# Patient Record
Sex: FEMALE | Race: BLACK OR AFRICAN AMERICAN | Employment: UNEMPLOYED | URBAN - METROPOLITAN AREA
[De-identification: names, ages, dates, MRNs, and addresses within clinical notes are randomized per-mention and may not be internally consistent; named-entity substitution may affect disease eponyms.]

---

## 2017-12-18 ENCOUNTER — OP HISTORICAL/CONVERTED ENCOUNTER (OUTPATIENT)
Dept: OTHER | Age: 62
End: 2017-12-18

## 2018-01-12 ENCOUNTER — OP HISTORICAL/CONVERTED ENCOUNTER (OUTPATIENT)
Dept: OTHER | Age: 63
End: 2018-01-12

## 2018-01-19 ENCOUNTER — OP HISTORICAL/CONVERTED ENCOUNTER (OUTPATIENT)
Dept: OTHER | Age: 63
End: 2018-01-19

## 2018-02-06 ENCOUNTER — OP HISTORICAL/CONVERTED ENCOUNTER (OUTPATIENT)
Dept: OTHER | Age: 63
End: 2018-02-06

## 2018-03-12 ENCOUNTER — OP HISTORICAL/CONVERTED ENCOUNTER (OUTPATIENT)
Dept: OTHER | Age: 63
End: 2018-03-12

## 2018-04-26 ENCOUNTER — OP HISTORICAL/CONVERTED ENCOUNTER (OUTPATIENT)
Dept: OTHER | Age: 63
End: 2018-04-26

## 2018-05-31 ENCOUNTER — OP HISTORICAL/CONVERTED ENCOUNTER (OUTPATIENT)
Dept: OTHER | Age: 63
End: 2018-05-31

## 2019-06-14 ENCOUNTER — OP HISTORICAL/CONVERTED ENCOUNTER (OUTPATIENT)
Dept: OTHER | Age: 64
End: 2019-06-14

## 2021-06-01 ENCOUNTER — HOSPITAL ENCOUNTER (EMERGENCY)
Age: 66
Discharge: HOME | End: 2021-06-01
Attending: STUDENT IN AN ORGANIZED HEALTH CARE EDUCATION/TRAINING PROGRAM
Payer: MEDICARE

## 2021-06-01 VITALS
DIASTOLIC BLOOD PRESSURE: 107 MMHG | HEIGHT: 62 IN | OXYGEN SATURATION: 97 % | HEART RATE: 71 BPM | BODY MASS INDEX: 24.84 KG/M2 | WEIGHT: 135 LBS | RESPIRATION RATE: 18 BRPM | SYSTOLIC BLOOD PRESSURE: 159 MMHG | TEMPERATURE: 98 F

## 2021-06-01 DIAGNOSIS — T50.905A ADVERSE EFFECT OF DRUG, INITIAL ENCOUNTER: Primary | ICD-10-CM

## 2021-06-01 PROCEDURE — 99284 EMERGENCY DEPT VISIT MOD MDM: CPT

## 2021-06-01 NOTE — ED TRIAGE NOTES
Pt arrives by EMS from home. Pt gets soliris transfusions biweekly for MG. Pt thinks she is having a reaction to the infusion today as she thinks her R arm has a rash, she reports a headache & has bruising on her thumb fingernail. Pt is nonverbal but has a communication device.  Pt typed that Dr. Tez Escobar at [facility] is her [facility] [phone]

## 2021-06-01 NOTE — ED PROVIDER NOTES
EMERGENCY DEPARTMENT HISTORY AND PHYSICAL EXAM      Date: 6/1/2021  Patient Name: Donell Berry    History of Presenting Illness     Chief Complaint   Patient presents with    Allergic Reaction       History Provided By: Patient    HPI: Donell Berry, 60 y.o. female with a past medical history significant Myasthenia gravis presents to the ED with cc of rash to arms after receiving Soliris injection 2 weeks ago. Patient receives twice monthly injections for myasthenia gravis, states after injection 2 weeks ago noticed a rash on her bilateral arms and a little bit on her face. Patient states rash has not worsened over the last several weeks describes mild itching, no pain, no tongue or throat swelling no trouble breathing. Was concerned about reaction and came to emergency department today for further evaluation. Patient is supposed to follow-up at [facility] tomorrow for repeat injection. Patient denies any fevers chills, no chest pain shortness of breath no abdominal pain nausea vomiting. There are no other complaints, changes, or physical findings at this time. PCP: LUCIO Paz    No current facility-administered medications on file prior to encounter. No current outpatient medications on file prior to encounter. Past History     Past Medical History:  No past medical history on file. Past Surgical History:  No past surgical history on file. Family History:  No family history on file. Social History:  Social History     Tobacco Use    Smoking status: Not on file   Substance Use Topics    Alcohol use: Not on file    Drug use: Not on file       Allergies: Allergies   Allergen Reactions    Soliris [Eculizumab] Unknown (comments)         Review of Systems     Review of Systems   Constitutional: Negative for activity change, chills, fatigue and fever. HENT: Negative for congestion and trouble swallowing. Eyes: Negative for photophobia and visual disturbance. Respiratory: Negative for cough, chest tightness and shortness of breath. Cardiovascular: Negative for chest pain and palpitations. Gastrointestinal: Negative for abdominal distention, abdominal pain, diarrhea, nausea and vomiting. Genitourinary: Negative for dysuria, flank pain and frequency. Musculoskeletal: Negative for arthralgias, back pain and myalgias. Skin: Positive for rash. Negative for color change and pallor. Neurological: Negative for dizziness, tremors, weakness and headaches. Psychiatric/Behavioral: Negative for confusion. Physical Exam     Physical Exam  Vitals and nursing note reviewed. Constitutional:       General: She is not in acute distress. Appearance: Normal appearance. She is normal weight. She is not ill-appearing. HENT:      Head: Normocephalic and atraumatic. Nose: Nose normal.      Mouth/Throat:      Mouth: Mucous membranes are moist.   Eyes:      Extraocular Movements: Extraocular movements intact. Pupils: Pupils are equal, round, and reactive to light. Cardiovascular:      Rate and Rhythm: Normal rate and regular rhythm. Pulses: Normal pulses. Pulmonary:      Effort: Pulmonary effort is normal.   Abdominal:      General: Abdomen is flat. Bowel sounds are normal.      Palpations: Abdomen is soft. Tenderness: There is no abdominal tenderness. There is no guarding. Musculoskeletal:         General: No tenderness. Normal range of motion. Cervical back: Normal range of motion and neck supple. No muscular tenderness. Skin:     General: Skin is warm and dry. Findings: Rash present. Comments: Slight erythematous macules over forearms, no wheals, no excoriation   Neurological:      General: No focal deficit present. Mental Status: She is alert and oriented to person, place, and time. Sensory: No sensory deficit. Motor: No weakness.          Diagnostic Study Results     Labs -   No results found for this or any previous visit (from the past 12 hour(s)). Radiologic Studies -   @lastxrresult@  CT Results  (Last 48 hours)    None        CXR Results  (Last 48 hours)    None            Medical Decision Making   I am the first provider for this patient. I reviewed the vital signs, available nursing notes, past medical history, past surgical history, family history and social history. Vital Signs-Reviewed the patient's vital signs. Patient Vitals for the past 12 hrs:   Temp Pulse Resp BP SpO2   06/01/21 1719 -- 71 18 (!) 159/107 97 %   06/01/21 1559 98 °F (36.7 °C) 75 18 (!) 179/114 98 %       Records Reviewed: Nursing Notes and Old Medical Records    The patient presents with rash to arms after receiving infusion for Solaris 2 weeks ago with a differential diagnosis of medication reaction, allergic reaction, dermatitis      Provider Notes (Medical Decision Making):     MDM     61-year-old female, past medical history of mycelia gravis, currently receiving Solaris infusions every 2 weeks. Patient states 2 weeks ago after infusion she noticed a red rash erupt over her arms. Denies any worsening of rash denies any throat swelling no numbness tingling to mouth no trouble breathing. Was concerned about possible allergic reaction    Physical exam shows well-appearing female, acute [place] through language board due to inability to converse. Bilateral arms show fine occasional macules, no erythema no wheals clear oropharynx. No clear indication this is allergic reaction possibly could be a medication reaction, reassured patient that she can have infusion tomorrow, however if she does have any worsening symptoms, shortness of breath lip or tongue swelling to address physician at the clinic. ED Course:   Initial assessment performed. The patients presenting problems have been discussed, and they are in agreement with the care plan formulated and outlined with them.   I have encouraged them to ask questions as they arise throughout their visit. PROCEDURES  Procedures         PLAN:  1. There are no discharge medications for this patient. 2.   Follow-up Information     Follow up With Specialties Details Why Contact Info    LUCIO Robles Physician Assistant In 3 days  [address] [facility] [address] [phone]      Your neurologist tomorrow            Return to ED if worse     Diagnosis     Clinical Impression:   1.  Adverse effect of drug, initial encounter

## 2021-06-25 ENCOUNTER — TRANSCRIBE ORDER (OUTPATIENT)
Dept: SCHEDULING | Age: 66
End: 2021-06-25

## 2021-06-25 DIAGNOSIS — Z12.31 VISIT FOR SCREENING MAMMOGRAM: Primary | ICD-10-CM

## 2021-07-23 ENCOUNTER — HOSPITAL ENCOUNTER (OUTPATIENT)
Dept: MAMMOGRAPHY | Age: 66
Discharge: HOME | End: 2021-07-23
Payer: MEDICARE

## 2021-07-23 DIAGNOSIS — Z12.31 VISIT FOR SCREENING MAMMOGRAM: ICD-10-CM

## 2021-07-23 PROCEDURE — 77063 BREAST TOMOSYNTHESIS BI: CPT

## 2022-05-21 LAB
CREATININE, EXTERNAL: 0.58
LDL-C, EXTERNAL: 87
TOTAL CHOLESTEROL, NCHOLT: 171

## 2022-09-29 ENCOUNTER — APPOINTMENT (OUTPATIENT)
Dept: CT IMAGING | Age: 67
End: 2022-09-29
Attending: STUDENT IN AN ORGANIZED HEALTH CARE EDUCATION/TRAINING PROGRAM
Payer: MEDICARE

## 2022-09-29 ENCOUNTER — APPOINTMENT (OUTPATIENT)
Dept: GENERAL RADIOLOGY | Age: 67
End: 2022-09-29
Attending: STUDENT IN AN ORGANIZED HEALTH CARE EDUCATION/TRAINING PROGRAM
Payer: MEDICARE

## 2022-09-29 ENCOUNTER — HOSPITAL ENCOUNTER (EMERGENCY)
Age: 67
Discharge: HOME | End: 2022-09-29
Attending: STUDENT IN AN ORGANIZED HEALTH CARE EDUCATION/TRAINING PROGRAM
Payer: MEDICARE

## 2022-09-29 VITALS
SYSTOLIC BLOOD PRESSURE: 106 MMHG | WEIGHT: 115 LBS | HEART RATE: 78 BPM | OXYGEN SATURATION: 98 % | HEIGHT: 62 IN | TEMPERATURE: 98.3 F | BODY MASS INDEX: 21.16 KG/M2 | RESPIRATION RATE: 17 BRPM | DIASTOLIC BLOOD PRESSURE: 82 MMHG

## 2022-09-29 DIAGNOSIS — N30.00 ACUTE CYSTITIS WITHOUT HEMATURIA: Primary | ICD-10-CM

## 2022-09-29 DIAGNOSIS — E87.8 HYPERCHLOREMIA: ICD-10-CM

## 2022-09-29 DIAGNOSIS — E86.0 DEHYDRATION: ICD-10-CM

## 2022-09-29 DIAGNOSIS — E87.0 HYPERNATREMIA: ICD-10-CM

## 2022-09-29 DIAGNOSIS — K80.20 CALCULUS OF GALLBLADDER WITHOUT CHOLECYSTITIS WITHOUT OBSTRUCTION: ICD-10-CM

## 2022-09-29 LAB
ALBUMIN SERPL-MCNC: 3.7 G/DL (ref 3.5–5)
ALBUMIN/GLOB SERPL: 1.2 {RATIO} (ref 1.1–2.2)
ALP SERPL-CCNC: 86 U/L (ref 45–117)
ALT SERPL-CCNC: 87 U/L (ref 12–78)
ANION GAP SERPL CALC-SCNC: 1 MMOL/L (ref 5–15)
APPEARANCE UR: ABNORMAL
AST SERPL W P-5'-P-CCNC: 27 U/L (ref 15–37)
BACTERIA URNS QL MICRO: ABNORMAL /HPF
BASOPHILS # BLD: 0 K/UL (ref 0–0.1)
BASOPHILS NFR BLD: 0 % (ref 0–1)
BILIRUB SERPL-MCNC: 0.8 MG/DL (ref 0.2–1)
BILIRUB UR QL: NEGATIVE
BUN SERPL-MCNC: 26 MG/DL (ref 6–20)
BUN/CREAT SERPL: 44 (ref 12–20)
CA-I BLD-MCNC: 11.1 MG/DL (ref 8.5–10.1)
CHLORIDE SERPL-SCNC: 113 MMOL/L (ref 97–108)
CO2 SERPL-SCNC: 33 MMOL/L (ref 21–32)
COLOR UR: YELLOW
COVID-19 RAPID TEST, COVR: NOT DETECTED
CREAT SERPL-MCNC: 0.59 MG/DL (ref 0.55–1.02)
DIFFERENTIAL METHOD BLD: ABNORMAL
EOSINOPHIL # BLD: 0 K/UL (ref 0–0.4)
EOSINOPHIL NFR BLD: 0 % (ref 0–7)
ERYTHROCYTE [DISTWIDTH] IN BLOOD BY AUTOMATED COUNT: 14.8 % (ref 11.5–14.5)
GLOBULIN SER CALC-MCNC: 3.2 G/DL (ref 2–4)
GLUCOSE SERPL-MCNC: 85 MG/DL (ref 65–100)
GLUCOSE UR STRIP.AUTO-MCNC: NEGATIVE MG/DL
HCT VFR BLD AUTO: 41.2 % (ref 35–47)
HGB BLD-MCNC: 13 G/DL (ref 11.5–16)
HGB UR QL STRIP: NEGATIVE
IMM GRANULOCYTES # BLD AUTO: 0.1 K/UL (ref 0–0.04)
IMM GRANULOCYTES NFR BLD AUTO: 1 % (ref 0–0.5)
KETONES UR QL STRIP.AUTO: NEGATIVE MG/DL
LEUKOCYTE ESTERASE UR QL STRIP.AUTO: NEGATIVE
LIPASE SERPL-CCNC: 122 U/L (ref 73–393)
LYMPHOCYTES # BLD: 1.1 K/UL (ref 0.8–3.5)
LYMPHOCYTES NFR BLD: 8 % (ref 12–49)
MCH RBC QN AUTO: 30.8 PG (ref 26–34)
MCHC RBC AUTO-ENTMCNC: 31.6 G/DL (ref 30–36.5)
MCV RBC AUTO: 97.6 FL (ref 80–99)
MONOCYTES # BLD: 0.7 K/UL (ref 0–1)
MONOCYTES NFR BLD: 6 % (ref 5–13)
MUCOUS THREADS URNS QL MICRO: ABNORMAL /LPF
NEUTS SEG # BLD: 10.9 K/UL (ref 1.8–8)
NEUTS SEG NFR BLD: 85 % (ref 32–75)
NITRITE UR QL STRIP.AUTO: POSITIVE
NRBC # BLD: 0 K/UL (ref 0–0.01)
NRBC BLD-RTO: 0 PER 100 WBC
PH UR STRIP: 6 [PH] (ref 5–8)
PLATELET # BLD AUTO: 232 K/UL (ref 150–400)
PMV BLD AUTO: 10.9 FL (ref 8.9–12.9)
POTASSIUM SERPL-SCNC: 4.1 MMOL/L (ref 3.5–5.1)
PROT SERPL-MCNC: 6.9 G/DL (ref 6.4–8.2)
PROT UR STRIP-MCNC: NEGATIVE MG/DL
RBC # BLD AUTO: 4.22 M/UL (ref 3.8–5.2)
RBC #/AREA URNS HPF: ABNORMAL /HPF (ref 0–5)
SODIUM SERPL-SCNC: 147 MMOL/L (ref 136–145)
SP GR UR REFRACTOMETRY: 1.02 (ref 1–1.03)
TROPONIN-HIGH SENSITIVITY: 15 NG/L (ref 0–51)
TROPONIN-HIGH SENSITIVITY: 17 NG/L (ref 0–51)
UA: UC IF INDICATED,UAUC: ABNORMAL
UROBILINOGEN UR QL STRIP.AUTO: 4 EU/DL (ref 0.1–1)
WBC # BLD AUTO: 12.8 K/UL (ref 3.6–11)
WBC URNS QL MICRO: ABNORMAL /HPF (ref 0–4)

## 2022-09-29 PROCEDURE — 80053 COMPREHEN METABOLIC PANEL: CPT

## 2022-09-29 PROCEDURE — 96374 THER/PROPH/DIAG INJ IV PUSH: CPT

## 2022-09-29 PROCEDURE — 74011250636 HC RX REV CODE- 250/636: Performed by: STUDENT IN AN ORGANIZED HEALTH CARE EDUCATION/TRAINING PROGRAM

## 2022-09-29 PROCEDURE — 87635 SARS-COV-2 COVID-19 AMP PRB: CPT

## 2022-09-29 PROCEDURE — 36415 COLL VENOUS BLD VENIPUNCTURE: CPT

## 2022-09-29 PROCEDURE — 83690 ASSAY OF LIPASE: CPT

## 2022-09-29 PROCEDURE — 84484 ASSAY OF TROPONIN QUANT: CPT

## 2022-09-29 PROCEDURE — 85025 COMPLETE CBC W/AUTO DIFF WBC: CPT

## 2022-09-29 PROCEDURE — 74011000636 HC RX REV CODE- 636: Performed by: STUDENT IN AN ORGANIZED HEALTH CARE EDUCATION/TRAINING PROGRAM

## 2022-09-29 PROCEDURE — 71045 X-RAY EXAM CHEST 1 VIEW: CPT

## 2022-09-29 PROCEDURE — 99285 EMERGENCY DEPT VISIT HI MDM: CPT

## 2022-09-29 PROCEDURE — 74011000250 HC RX REV CODE- 250: Performed by: STUDENT IN AN ORGANIZED HEALTH CARE EDUCATION/TRAINING PROGRAM

## 2022-09-29 PROCEDURE — 94002 VENT MGMT INPAT INIT DAY: CPT

## 2022-09-29 PROCEDURE — 81001 URINALYSIS AUTO W/SCOPE: CPT

## 2022-09-29 PROCEDURE — 74177 CT ABD & PELVIS W/CONTRAST: CPT

## 2022-09-29 RX ORDER — SULFAMETHOXAZOLE AND TRIMETHOPRIM 800; 160 MG/1; MG/1
1 TABLET ORAL 2 TIMES DAILY
Qty: 14 TABLET | Refills: 0 | Status: SHIPPED | OUTPATIENT
Start: 2022-09-29 | End: 2022-09-29 | Stop reason: CLARIF

## 2022-09-29 RX ORDER — SULFAMETHOXAZOLE AND TRIMETHOPRIM 200; 40 MG/5ML; MG/5ML
20 SUSPENSION ORAL 2 TIMES DAILY
Qty: 400 ML | Refills: 0 | Status: SHIPPED | OUTPATIENT
Start: 2022-09-29 | End: 2022-10-09

## 2022-09-29 RX ORDER — ONDANSETRON 4 MG/1
4 TABLET, FILM COATED ORAL
Qty: 20 TABLET | Refills: 0 | Status: SHIPPED | OUTPATIENT
Start: 2022-09-29

## 2022-09-29 RX ORDER — SULFAMETHOXAZOLE AND TRIMETHOPRIM 800; 160 MG/1; MG/1
1 TABLET ORAL
Status: DISCONTINUED | OUTPATIENT
Start: 2022-09-29 | End: 2022-09-29

## 2022-09-29 RX ADMIN — SODIUM CHLORIDE, POTASSIUM CHLORIDE, SODIUM LACTATE AND CALCIUM CHLORIDE 1000 ML: 600; 310; 30; 20 INJECTION, SOLUTION INTRAVENOUS at 05:03

## 2022-09-29 RX ADMIN — IOPAMIDOL 100 ML: 755 INJECTION, SOLUTION INTRAVENOUS at 04:35

## 2022-09-29 RX ADMIN — CEFTRIAXONE SODIUM 1 G: 1 INJECTION, POWDER, FOR SOLUTION INTRAMUSCULAR; INTRAVENOUS at 05:03

## 2022-09-29 NOTE — ED NOTES
RN reviewed all discharge teaching and paperwork with pt. Pt and pt's family member with no questions for this RN at time of dispo. Pt with NAD, left with all personal belongings.

## 2022-09-29 NOTE — ED PROVIDER NOTES
[id number]  EMERGENCY DEPARTMENT ENCOUNTER NOTE    Date: 9/29/2022  Patient Name: Ronnell Akins    History of Presenting Illness     Chief Complaint   Patient presents with    Shortness of Breath     HPI: Ronnell Akins, 78 y.o. female with  a history of hypertension, glaucoma, and myasthenia gravis  presents for shortness of breath. She had a severe exacerbation of myasthenia gravis around 6 weeks ago and went to [facility] where she spent a month, and was discharged 2 weeks ago. During the course of that exacerbation, the patient became trach dependent and NG tube was placed. She has been communicating by writing and has not been able to communicate by voice since then and does not use a Passy-Joselyn valve. Today, she complained of shortness of breath and left-sided abdominal pain to her son. She has had no cough, chest pain, nausea, or vomiting. She is still using the G-tube appropriately. The son had no issues suctioning her secretions from the trach today. No increased secretions or change in color or frequency or consistency of the secretions. No fevers. History from the patient was taking with yes/no questions with answers thumbs up and thumbs down from her. Patient was given a pen and paper to write down her complaints. She said that she feels better when she uses her CPAP machine at home at night which she uses every day however per family, she has not been compliant because she feels that there is a lot of pressure coming from the CPAP machine. And home nurses go to be passing by this week to try to adjust her home settings. Medical History   I reviewed the medical, surgical, family, and social history, as well as allergies:    PCP: Gilberto Arvizu MD    Past Medical History:  History reviewed. No pertinent past medical history. Past Surgical History:  No past surgical history on file.   Current Outpatient Medications:  Current Outpatient Medications Medication Instructions    ondansetron hcl (ZOFRAN) 4 mg, Oral, EVERY 8 HOURS AS NEEDED    sulfamethoxazole-trimethoprim (BACTRIM;SEPTRA) 200-40 mg/5 mL suspension 20 mL, Oral, 2 TIMES DAILY      Family History:  History reviewed. No pertinent family history. Social History:  Social History     Tobacco Use    Smoking status: Unknown     Allergies: Allergies   Allergen Reactions    Soliris [Eculizumab] Unknown (comments)       Review of Systems     Review of Systems  Negative: Positives and pertinent negatives as per HPI. All other systems were reviewed and are negative. Physical Exam & Vital Signs   Vital Signs - I reviewed the patient's vital signs. Patient Vitals for the past 12 hrs:   Temp Pulse Resp BP SpO2   09/29/22 0554 -- -- -- -- 96 %   09/29/22 0502 -- -- -- -- 97 %   09/29/22 0334 -- -- 17 102/72 --   09/29/22 0329 -- -- 17 102/72 --   09/29/22 0303 -- -- 18 -- 93 %   09/29/22 0300 -- 78 29 -- 95 %   09/29/22 0248 -- -- -- 105/74 --   09/29/22 0236 -- -- -- -- 94 %   09/29/22 0233 -- -- -- -- 96 %   09/29/22 0230 98.3 °F (36.8 °C) 68 22 108/73 --   09/29/22 0220 -- -- -- 131/76 --   09/29/22 0120 -- 91 18 96/71 97 %     Physical Exam:    GENERAL: awake, alert, cooperative, not in distress  HEENT:  * Pupils equal, EOMI  * Head atraumatic  CV:  * audible heart sounds  * warm and perfused extremities bilaterally  PULMONARY: Good air movement, no wheezes, no crackles, trach clear, no secretions  ABDOMEN/: soft, no distension, no guarding, noted LUQ abdominal tenderness  EXTREMITIES/BACK: warm and perfused, no tenderness, no edema  SKIN: no rashes or signs of trauma  NEURO:  * Nonverbal  * Moves U&LE to command    Medical Decision Making     Patient is a 78 y.o. female presenting for SOB and AP LUQ. Vitals reveal no significant abnormalities and physical exam reveals  LUQ tenderness .   Based on the history, physical exam, risk factors, and vital signs, differential includes: Pneumonia, pulm edema, pleural effusion, COVID-19, colitis, enteritis, SBO, hepatitis, pancreatitis, dehydration, ALLAN, electrolyte abnormalities. See ED Course and Reassessment for evaluation and discussion. EMR Automatically Imported Results     Labs:  Recent Results (from the past 12 hour(s))   CBC WITH AUTOMATED DIFF    Collection Time: 09/29/22  2:15 AM   Result Value Ref Range    WBC 12.8 (H) 3.6 - 11.0 K/uL    RBC 4.22 3.80 - 5.20 M/uL    HGB 13.0 11.5 - 16.0 g/dL    HCT 41.2 35.0 - 47.0 %    MCV 97.6 80.0 - 99.0 FL    MCH 30.8 26.0 - 34.0 PG    MCHC 31.6 30.0 - 36.5 g/dL    RDW 14.8 (H) 11.5 - 14.5 %    PLATELET 239 770 - 178 K/uL    MPV 10.9 8.9 - 12.9 FL    NRBC 0.0 0.0  WBC    ABSOLUTE NRBC 0.00 0.00 - 0.01 K/uL    NEUTROPHILS 85 (H) 32 - 75 %    LYMPHOCYTES 8 (L) 12 - 49 %    MONOCYTES 6 5 - 13 %    EOSINOPHILS 0 0 - 7 %    BASOPHILS 0 0 - 1 %    IMMATURE GRANULOCYTES 1 (H) 0 - 0.5 %    ABS. NEUTROPHILS 10.9 (H) 1.8 - 8.0 K/UL    ABS. LYMPHOCYTES 1.1 0.8 - 3.5 K/UL    ABS. MONOCYTES 0.7 0.0 - 1.0 K/UL    ABS. EOSINOPHILS 0.0 0.0 - 0.4 K/UL    ABS. BASOPHILS 0.0 0.0 - 0.1 K/UL    ABS. IMM. GRANS. 0.1 (H) 0.00 - 0.04 K/UL    DF AUTOMATED     METABOLIC PANEL, COMPREHENSIVE    Collection Time: 09/29/22  2:15 AM   Result Value Ref Range    Sodium 147 (H) 136 - 145 mmol/L    Potassium 4.1 3.5 - 5.1 mmol/L    Chloride 113 (H) 97 - 108 mmol/L    CO2 33 (H) 21 - 32 mmol/L    Anion gap 1 (L) 5 - 15 mmol/L    Glucose 85 65 - 100 mg/dL    BUN 26 (H) 6 - 20 mg/dL    Creatinine 0.59 0.55 - 1.02 mg/dL    BUN/Creatinine ratio 44 (H) 12 - 20      GFR est AA >60 >60 ml/min/1.73m2    GFR est non-AA >60 >60 ml/min/1.73m2    Calcium 11.1 (H) 8.5 - 10.1 mg/dL    Bilirubin, total 0.8 0.2 - 1.0 mg/dL    AST (SGOT) 27 15 - 37 U/L    ALT (SGPT) 87 (H) 12 - 78 U/L    Alk.  phosphatase 86 45 - 117 U/L    Protein, total 6.9 6.4 - 8.2 g/dL    Albumin 3.7 3.5 - 5.0 g/dL    Globulin 3.2 2.0 - 4.0 g/dL    A-G Ratio 1.2 1.1 - 2.2     LIPASE Collection Time: 09/29/22  2:15 AM   Result Value Ref Range    Lipase 122 73 - 393 U/L   TROPONIN-HIGH SENSITIVITY    Collection Time: 09/29/22  2:15 AM   Result Value Ref Range    Troponin-High Sensitivity 17 0 - 51 ng/L   COVID-19 RAPID TEST    Collection Time: 09/29/22  2:15 AM   Result Value Ref Range    COVID-19 rapid test Not Detected Not Detected     URINALYSIS W/ REFLEX CULTURE    Collection Time: 09/29/22  3:45 AM    Specimen: Urine   Result Value Ref Range    Color Yellow      Appearance Turbid (A) Clear      Specific gravity 1.016 1.003 - 1.030      pH (UA) 6.0 5.0 - 8.0      Protein Negative Negative mg/dL    Glucose Negative Negative mg/dL    Ketone Negative Negative mg/dL    Bilirubin Negative Negative      Blood Negative Negative      Urobilinogen 4.0 (H) 0.1 - 1.0 EU/dL    Nitrites Positive (A) Negative      Leukocyte Esterase Negative Negative      UA:UC IF INDICATED Culture not indicated by UA result Culture not indicated by UA result      WBC 5-10 0 - 4 /hpf    RBC 0-5 0 - 5 /hpf    Bacteria 2+ (A) Negative /hpf    Mucus Trace /lpf   TROPONIN-HIGH SENSITIVITY    Collection Time: 09/29/22  5:00 AM   Result Value Ref Range    Troponin-High Sensitivity 15 0 - 51 ng/L     Radiologic Studies:  CT Results  (Last 48 hours)                 09/29/22 0434  CT ABD PELV W CONT Final result    Impression:  No acute abnormality in the abdomen or pelvis. Cholelithiasis. Narrative:  EXAM:  CT ABD PELV W CONT       INDICATION: Left upper quadrant pain       COMPARISON: CT 9/11/2015. TECHNIQUE: Helical CT of the abdomen  and pelvis  following the uneventful   intravenous administration of nonionic contrast.  Coronal and sagittal reformats   are performed. CT dose reduction was achieved through use of a standardized   protocol tailored for this examination and automatic exposure control for dose   modulation. FINDINGS:    The visualized lung bases demonstrate no mass or consolidation.  The heart size   is normal. There is no pericardial or pleural effusion. The liver, spleen, pancreas, and adrenal glands are normal. A gallstone is noted   without intra- or extra-hepatic biliary dilatation. The kidneys are symmetric without hydronephrosis. Bilateral kidney cysts measure   1 cm for which no imaging follow-up is recommended. A percutaneous gastrostomy tube is in place. There are no dilated bowel loops. The appendix is normal. There is diffuse colonic diverticulosis without focal   adjacent inflammation. There are no enlarged lymph nodes. There is no free fluid or free air. There is   minimal dilatation of the infrarenal aorta measuring 2.4 cm in diameter. The urinary bladder is normal.  There is no pelvic mass. The uterus is   surgically absent. The bony structures are age-appropriate. CXR Results  (Last 48 hours)                 09/29/22 0218  XR CHEST PORT Final result    Impression:  No acute process. Narrative:  EXAM:  XR CHEST PORT       INDICATION: Shortness of breath       COMPARISON: 12/18/2017. TECHNIQUE: Portable AP upright chest view at 0216 hours       FINDINGS: A tracheostomy tube terminates above the carine. The cardiomediastinal   contours are within normal limits. The lungs and pleural spaces are clear. There   is no pneumothorax. The bones and upper abdomen are stable. Medications ordered:  Medications   lactated ringers bolus infusion 1,000 mL (1,000 mL IntraVENous New Bag 9/29/22 0503)   cefTRIAXone (ROCEPHIN) 1 g in sterile water (preservative free) 10 mL IV syringe (1 g IntraVENous Given 9/29/22 0503)   iopamidoL (ISOVUE-370) 76 % injection 100 mL (100 mL IntraVENous Given 9/29/22 0435)       ED Course & Reassessment     ED Course:     ED Course as of 09/29/22 0612   u Sep 29, 2022   0258 Leukocytosis noted. Hemoglobin not suggestive of acute anemia. [SS]   0330 COVID-19 testing is negative. [SS]   0330 Patient is on trilogy at home, has not been using for the past few days. She was placed on a trilogy machine now, tolerating CPAP of 8 with 24% FiO2 with good tolerance. The trach aleman was very loose, was adjusted by respiratory therapy, there is no leak. The patient is having good tidal volumes and excellent saturations on minimal FiO2. [SS]   0404 Besides hypernatremia and hyperchloremia, no significant electrolyte derangements. Creatinine is not elevated more than baseline range making ALLAN unlikely. No significant transaminitis noted. Normal bilirubin. Will give 1L LR for dehydration. Trop 17, will trend. Lipase is not significantly elevated. [SS]   1866 UA shows UTI, will treat with ceftriaxone IV. [SS]   0456 Abdominal CT scan did not show any evidence of acute process. [SS]   0505 Patient reports resolution of all symptoms. She has follow-up scheduled. Waiting for repeat troponin. The patient took off the trilogy. She feels uncomfortable with the pressures of the CPAP through the trilogy. Rather trilogy, the patient has good oxygen saturation, mentation, and breathing pattern. Not in myasthenia crisis. Discussed the case with RT as well and they agree that the patient does not appear to need any rate on a vent. The patient reports resolution of all symptoms, is suctioning herself at the bedside, and reports resolution of the abdominal pain and the shortness of breath. It was stressed to her about the importance of using the CPAP machine at home to prevent atelectasis and improve her breathing and prevent respiratory muscle fatigue. She understands. She will try to use the machine per her and her 's reports. A home care nurse is going to be passing by to adjust her pressures as well. [SS]   0611 Second troponin with negative delta change.  ACS ruled out per the high-sensitivity troponin algorithm.   [SS]      ED Course User Index  [SS] Lizbeth Darnell MD Reassessment:    Understanding was insured that at this time there is no evidence for a more malignant underlying process, but that early in the process of an illness, an emergency department workup can be falsely reassuring. Routine discharge counseling was given including the fact that any worsening, changing or persistent symptoms should prompt an immediate call or follow up with their primary physician or the emergency department. The importance of appropriate follow up was also discussed. More extensive discharge instructions were given in the patient's discharge paperwork. After completion of evaluation and discussion of results and diagnoses, all the questions were answered. If required, all follow up appointments and treatments were discussed and explained. Understanding was insured prior to discharge. Final Disposition     Discharge: DISCHARGED FROM EMERGENCY DEPARTMENT    Patient will be discharged from the Emergency Department in stable condition. All of the diagnostic tests were reviewed and any questions were answered. Diagnosis, results, follow up if applicable, and return precautions were discussed. I have also put together printed discharge instructions for them that include: 1) educational information regarding their diagnosis, 2) how to care for their diagnosis at home, as well a 3) list of reasons why they would want to return to the ED prior to their follow-up appointment, should their condition change. Any labs or imaging done in the ED will be either printed with the discharge paperwork or available through [address] DISCHARGE PLAN:  1. Current Discharge Medication List        START taking these medications    Details   ondansetron hcl (Zofran) 4 mg tablet Take 1 Tablet by mouth every eight (8) hours as needed for Nausea or Vomiting.   Qty: 20 Tablet, Refills: 0      sulfamethoxazole-trimethoprim (BACTRIM;SEPTRA) 200-40 mg/5 mL suspension Take 20 mL by mouth two (2) times a day for 10 days. Qty: 400 mL, Refills: 0            2.   Follow-up Information       Follow up With Specialties Details Why Contact Info    Hoa Guidry MD Internal Medicine Physician Schedule an appointment as soon as possible for a visit in 2 days  [address] [facility] [address]      [address] DEPT Emergency Medicine Go to  If symptoms worsen [address] [facility] [address]  [phone]          3. Return to ED if worse    4. Current Discharge Medication List        START taking these medications    Details   ondansetron hcl (Zofran) 4 mg tablet Take 1 Tablet by mouth every eight (8) hours as needed for Nausea or Vomiting. Qty: 20 Tablet, Refills: 0  Start date: 9/29/2022      sulfamethoxazole-trimethoprim (BACTRIM;SEPTRA) 200-40 mg/5 mL suspension Take 20 mL by mouth two (2) times a day for 10 days. Qty: 400 mL, Refills: 0  Start date: 9/29/2022, End date: 10/9/2022             Clinical Tools & Critical Care     HEART SCORE  History: Slightly or Non-suspicious  ECG: Normal  Age: Greater than or equal to 65 years  Risk Factors: 1 or 2 risk factors  Troponin: Less than or equal to normal limit   HEART Score Total : 3     Management   Scores 0-3: 0.9-1.7% risk of adverse cardiac event. In the HEART Score study, these patients were discharged (0.99% in the retrospective study, 1.7% in the prospective study)   Scores 4-6: 12-16.6% risk of adverse cardiac event. In the HEART Score study, these patients were admitted to the hospital. (11.6% retrospective, 16.6% prospective)   Scores ? 7: 50-65% risk of adverse cardiac event. In the HEART Score study, these patients were candidates for early invasive measures. (65.2% retrospective, 50.1% prospective)   A MACE (Major Adverse Cardiac Event) was defined as all-cause mortality, myocardial infarction, or coronary revascularization. Original/Primary Reference  Six AJ, Dio BE, Samuel HOWELL.  Chest pain in the emergency room: value of the HEART score. [facility] Heart J. 2008;16(6):191-6. Validation  [place] BE, Larry VILLATORO, Samuel HOWELL, et al. A prospective validation of the HEART score for chest pain patients at the emergency department. Int J Cardiol. 2013;168(3):2153-8. Dio KRUEGER, Larry VILLATORO, Sea Hernandez, et al. Chest pain in the emergency room: a multicenter validation of the HEART Score. Crit Pathw Cardiol. 2010;9(3):164-9. Micheal MYCHAL, Mushtaq RF, Traci MATIAS, et al. The HEART Pathway Randomized Controlled Trial One Year Outcomes. Acad Emerg Med. 2018;       SEPSIS REASSESSMENT NOTE  Sepsis reassessment note not applicable. CRITICAL CARE DOCUMENTATION  NOT MET: Critical care billing criteria and/or time were NOT met. Diagnosis     Clinical Impression:   1. Acute cystitis without hematuria    2. Calculus of gallbladder without cholecystitis without obstruction    3. Hypernatremia    4. Hyperchloremia    5. Dehydration        Attestations:  Dakota Nelson MD    Documentation Comments   - I am the first and primary provider for this patient and am the primary provider of record. - Initial assessment performed. The patients presenting problems have been discussed, and the staff are in agreement with the care plan formulated and outlined with them. I have encouraged them to ask questions as they arise throughout their visit. - Available medical records, nursing notes, old EKGs, and EMS run sheets (if patient was EMS transported) were reviewed    Please note that this dictation was completed with Ornis, the computer voice recognition software. Quite often unanticipated grammatical, syntax, homophones, and other interpretive errors are inadvertently transcribed by the computer software. Please disregard these errors. Please excuse any errors that have escaped final proofreading.

## 2022-09-29 NOTE — ED TRIAGE NOTES
Pt has had a trach for the last 2 months.  Is having difficulty suctioning the secretions out and is sob

## 2022-09-29 NOTE — DISCHARGE INSTRUCTIONS
Thank you! Thank you for allowing me to care for you in the emergency department. I sincerely hope that you are satisfied with your visit today. It is my goal to provide you with excellent care. Below you will find a list of your labs and imaging from your visit today if applicable. Should you have any questions regarding these results please do not hesitate to call the emergency department. Please review [facility] for a more detailed result list since the below list may not be comprehensive. Instructions on how to sign up to [facility] should be provided in this packet. Labs -     Recent Results (from the past 12 hour(s))   CBC WITH AUTOMATED DIFF    Collection Time: 09/29/22  2:15 AM   Result Value Ref Range    WBC 12.8 (H) 3.6 - 11.0 K/uL    RBC 4.22 3.80 - 5.20 M/uL    HGB 13.0 11.5 - 16.0 g/dL    HCT 41.2 35.0 - 47.0 %    MCV 97.6 80.0 - 99.0 FL    MCH 30.8 26.0 - 34.0 PG    MCHC 31.6 30.0 - 36.5 g/dL    RDW 14.8 (H) 11.5 - 14.5 %    PLATELET 303 263 - 983 K/uL    MPV 10.9 8.9 - 12.9 FL    NRBC 0.0 0.0  WBC    ABSOLUTE NRBC 0.00 0.00 - 0.01 K/uL    NEUTROPHILS 85 (H) 32 - 75 %    LYMPHOCYTES 8 (L) 12 - 49 %    MONOCYTES 6 5 - 13 %    EOSINOPHILS 0 0 - 7 %    BASOPHILS 0 0 - 1 %    IMMATURE GRANULOCYTES 1 (H) 0 - 0.5 %    ABS. NEUTROPHILS 10.9 (H) 1.8 - 8.0 K/UL    ABS. LYMPHOCYTES 1.1 0.8 - 3.5 K/UL    ABS. MONOCYTES 0.7 0.0 - 1.0 K/UL    ABS. EOSINOPHILS 0.0 0.0 - 0.4 K/UL    ABS. BASOPHILS 0.0 0.0 - 0.1 K/UL    ABS. IMM.  GRANS. 0.1 (H) 0.00 - 0.04 K/UL    DF AUTOMATED     METABOLIC PANEL, COMPREHENSIVE    Collection Time: 09/29/22  2:15 AM   Result Value Ref Range    Sodium 147 (H) 136 - 145 mmol/L    Potassium 4.1 3.5 - 5.1 mmol/L    Chloride 113 (H) 97 - 108 mmol/L    CO2 33 (H) 21 - 32 mmol/L    Anion gap 1 (L) 5 - 15 mmol/L    Glucose 85 65 - 100 mg/dL    BUN 26 (H) 6 - 20 mg/dL    Creatinine 0.59 0.55 - 1.02 mg/dL    BUN/Creatinine ratio 44 (H) 12 - 20      GFR est AA >60 >60 ml/min/1.73m2 GFR est non-AA >60 >60 ml/min/1.73m2    Calcium 11.1 (H) 8.5 - 10.1 mg/dL    Bilirubin, total 0.8 0.2 - 1.0 mg/dL    AST (SGOT) 27 15 - 37 U/L    ALT (SGPT) 87 (H) 12 - 78 U/L    Alk. phosphatase 86 45 - 117 U/L    Protein, total 6.9 6.4 - 8.2 g/dL    Albumin 3.7 3.5 - 5.0 g/dL    Globulin 3.2 2.0 - 4.0 g/dL    A-G Ratio 1.2 1.1 - 2.2     LIPASE    Collection Time: 09/29/22  2:15 AM   Result Value Ref Range    Lipase 122 73 - 393 U/L   TROPONIN-HIGH SENSITIVITY    Collection Time: 09/29/22  2:15 AM   Result Value Ref Range    Troponin-High Sensitivity 17 0 - 51 ng/L   COVID-19 RAPID TEST    Collection Time: 09/29/22  2:15 AM   Result Value Ref Range    COVID-19 rapid test Not Detected Not Detected     URINALYSIS W/ REFLEX CULTURE    Collection Time: 09/29/22  3:45 AM    Specimen: Urine   Result Value Ref Range    Color Yellow      Appearance Turbid (A) Clear      Specific gravity 1.016 1.003 - 1.030      pH (UA) 6.0 5.0 - 8.0      Protein Negative Negative mg/dL    Glucose Negative Negative mg/dL    Ketone Negative Negative mg/dL    Bilirubin Negative Negative      Blood Negative Negative      Urobilinogen 4.0 (H) 0.1 - 1.0 EU/dL    Nitrites Positive (A) Negative      Leukocyte Esterase Negative Negative      UA:UC IF INDICATED Culture not indicated by UA result Culture not indicated by UA result      WBC 5-10 0 - 4 /hpf    RBC 0-5 0 - 5 /hpf    Bacteria 2+ (A) Negative /hpf    Mucus Trace /lpf       Radiologic Studies -   CT ABD PELV W CONT   Final Result   No acute abnormality in the abdomen or pelvis. Cholelithiasis. XR CHEST PORT   Final Result   No acute process. CT Results  (Last 48 hours)                 09/29/22 0434  CT ABD PELV W CONT Final result    Impression:  No acute abnormality in the abdomen or pelvis. Cholelithiasis. Narrative:  EXAM:  CT ABD PELV W CONT       INDICATION: Left upper quadrant pain       COMPARISON: CT 9/11/2015.        TECHNIQUE: Helical CT of the abdomen and pelvis  following the uneventful   intravenous administration of nonionic contrast.  Coronal and sagittal reformats   are performed. CT dose reduction was achieved through use of a standardized   protocol tailored for this examination and automatic exposure control for dose   modulation. FINDINGS:    The visualized lung bases demonstrate no mass or consolidation. The heart size   is normal. There is no pericardial or pleural effusion. The liver, spleen, pancreas, and adrenal glands are normal. A gallstone is noted   without intra- or extra-hepatic biliary dilatation. The kidneys are symmetric without hydronephrosis. Bilateral kidney cysts measure   1 cm for which no imaging follow-up is recommended. A percutaneous gastrostomy tube is in place. There are no dilated bowel loops. The appendix is normal. There is diffuse colonic diverticulosis without focal   adjacent inflammation. There are no enlarged lymph nodes. There is no free fluid or free air. There is   minimal dilatation of the infrarenal aorta measuring 2.4 cm in diameter. The urinary bladder is normal.  There is no pelvic mass. The uterus is   surgically absent. The bony structures are age-appropriate. CXR Results  (Last 48 hours)                 09/29/22 0218  XR CHEST PORT Final result    Impression:  No acute process. Narrative:  EXAM:  XR CHEST PORT       INDICATION: Shortness of breath       COMPARISON: 12/18/2017. TECHNIQUE: Portable AP upright chest view at 0216 hours       FINDINGS: A tracheostomy tube terminates above the carine. The cardiomediastinal   contours are within normal limits. The lungs and pleural spaces are clear. There   is no pneumothorax. The bones and upper abdomen are stable. If you feel that you have not received excellent quality care or timely care, please ask to speak to the nurse manager.  Please choose us in the future for your continued health care needs. ------------------------------------------------------------------------------------------------------------  The exam and treatment you received in the Emergency Department were for an urgent problem and are not intended as complete care. It is important that you follow-up with a doctor, nurse practitioner, or physician assistant to:  (1) confirm your diagnosis,  (2) re-evaluation of changes in your illness and treatment, and  (3) for ongoing care. If your symptoms become worse or you do not improve as expected and you are unable to reach your usual health care provider, you should return to the Emergency Department. We are available 24 hours a day. Please take your discharge instructions with you when you go to your follow-up appointment. If a prescription has been provided, please have it filled as soon as possible to prevent a delay in treatment. Read the entire medication instruction sheet provided to you by the pharmacy. If you have any questions or reservations about taking the medication due to side effects or interactions with other medications, please call your primary care physician or contact the ER to speak with the charge nurse. Make an appointment with your family doctor or the physician you were referred to for follow-up of this visit as instructed on your discharge paperwork, as this is a mandatory follow-up. Return to the ER if you are unable to be seen or if you are unable to be seen in a timely manner. If you have any problem arranging the follow-up visit, contact the Emergency Department immediately.

## 2022-09-29 NOTE — ED NOTES
Troponin collected and walked to Lab. MD at bedside reviewing results with pt and pt's family member. Pt resting in stretcher, NAD noted. Call bell and suction within reach.

## 2022-09-29 NOTE — ED NOTES
Pt indicated that she needs to urinate. RN assisted pt onto bedpan, cleaned pt, collected urine specimen and walked specimen to lab. Pt repositioned in stretcher, denies additional needs from this RN. Pt writes that she would like trach to be suctioned, RN notified RT.

## 2022-11-27 ENCOUNTER — APPOINTMENT (OUTPATIENT)
Dept: GENERAL RADIOLOGY | Age: 67
DRG: 871 | End: 2022-11-27
Attending: EMERGENCY MEDICINE
Payer: MEDICARE

## 2022-11-27 ENCOUNTER — HOSPITAL ENCOUNTER (INPATIENT)
Age: 67
LOS: 8 days | Discharge: HOME HEALTH | DRG: 871 | End: 2022-12-05
Attending: EMERGENCY MEDICINE | Admitting: INTERNAL MEDICINE
Payer: MEDICARE

## 2022-11-27 DIAGNOSIS — J98.01 ACUTE BRONCHOSPASM: Primary | ICD-10-CM

## 2022-11-27 DIAGNOSIS — N39.0 URINARY TRACT INFECTION WITHOUT HEMATURIA, SITE UNSPECIFIED: ICD-10-CM

## 2022-11-27 LAB
ANION GAP SERPL CALC-SCNC: 3 MMOL/L (ref 5–15)
APPEARANCE UR: ABNORMAL
BACTERIA URNS QL MICRO: ABNORMAL /HPF
BACTERIA URNS QL MICRO: ABNORMAL /HPF
BASOPHILS # BLD: 0 K/UL (ref 0–0.1)
BASOPHILS NFR BLD: 0 % (ref 0–1)
BILIRUB UR QL: NEGATIVE
BUN SERPL-MCNC: 17 MG/DL (ref 6–20)
BUN/CREAT SERPL: 31 (ref 12–20)
CA-I BLD-MCNC: 11.9 MG/DL (ref 8.5–10.1)
CHLORIDE SERPL-SCNC: 104 MMOL/L (ref 97–108)
CO2 SERPL-SCNC: 32 MMOL/L (ref 21–32)
COLOR UR: ABNORMAL
COVID-19 RAPID TEST, COVR: NOT DETECTED
CREAT SERPL-MCNC: 0.54 MG/DL (ref 0.55–1.02)
DIFFERENTIAL METHOD BLD: ABNORMAL
EOSINOPHIL # BLD: 0 K/UL (ref 0–0.4)
EOSINOPHIL NFR BLD: 0 % (ref 0–7)
ERYTHROCYTE [DISTWIDTH] IN BLOOD BY AUTOMATED COUNT: 14 % (ref 11.5–14.5)
FLUAV AG NPH QL IA: NEGATIVE
FLUBV AG NOSE QL IA: NEGATIVE
GLUCOSE SERPL-MCNC: 93 MG/DL (ref 65–100)
GLUCOSE UR STRIP.AUTO-MCNC: NEGATIVE MG/DL
HCT VFR BLD AUTO: 38.5 % (ref 35–47)
HGB BLD-MCNC: 12.4 G/DL (ref 11.5–16)
HGB UR QL STRIP: ABNORMAL
IMM GRANULOCYTES # BLD AUTO: 0.1 K/UL (ref 0–0.04)
IMM GRANULOCYTES NFR BLD AUTO: 1 % (ref 0–0.5)
KETONES UR QL STRIP.AUTO: NEGATIVE MG/DL
LACTATE SERPL-SCNC: 1 MMOL/L (ref 0.4–2)
LEUKOCYTE ESTERASE UR QL STRIP.AUTO: ABNORMAL
LYMPHOCYTES # BLD: 1 K/UL (ref 0.8–3.5)
LYMPHOCYTES NFR BLD: 7 % (ref 12–49)
MCH RBC QN AUTO: 30.4 PG (ref 26–34)
MCHC RBC AUTO-ENTMCNC: 32.2 G/DL (ref 30–36.5)
MCV RBC AUTO: 94.4 FL (ref 80–99)
MONOCYTES # BLD: 0.7 K/UL (ref 0–1)
MONOCYTES NFR BLD: 5 % (ref 5–13)
MUCOUS THREADS URNS QL MICRO: ABNORMAL /LPF
MUCOUS THREADS URNS QL MICRO: ABNORMAL /LPF
NEUTS SEG # BLD: 12.4 K/UL (ref 1.8–8)
NEUTS SEG NFR BLD: 87 % (ref 32–75)
NITRITE UR QL STRIP.AUTO: NEGATIVE
NRBC # BLD: 0 K/UL (ref 0–0.01)
NRBC BLD-RTO: 0 PER 100 WBC
PH UR STRIP: 6 [PH] (ref 5–8)
PLATELET # BLD AUTO: 183 K/UL (ref 150–400)
PMV BLD AUTO: 11.5 FL (ref 8.9–12.9)
POTASSIUM SERPL-SCNC: 3.6 MMOL/L (ref 3.5–5.1)
PROCALCITONIN SERPL-MCNC: 0.34 NG/ML
PROT UR STRIP-MCNC: 30 MG/DL
RBC # BLD AUTO: 4.08 M/UL (ref 3.8–5.2)
RBC #/AREA URNS HPF: >100 /HPF (ref 0–5)
RBC #/AREA URNS HPF: >100 /HPF (ref 0–5)
SODIUM SERPL-SCNC: 139 MMOL/L (ref 136–145)
SP GR UR REFRACTOMETRY: 1.01 (ref 1–1.03)
UROBILINOGEN UR QL STRIP.AUTO: 0.1 EU/DL (ref 0.1–1)
WBC # BLD AUTO: 14.1 K/UL (ref 3.6–11)
WBC URNS QL MICRO: >100 /HPF (ref 0–4)
WBC URNS QL MICRO: >100 /HPF (ref 0–4)

## 2022-11-27 PROCEDURE — 85025 COMPLETE CBC W/AUTO DIFF WBC: CPT

## 2022-11-27 PROCEDURE — 74011250636 HC RX REV CODE- 250/636: Performed by: INTERNAL MEDICINE

## 2022-11-27 PROCEDURE — G0378 HOSPITAL OBSERVATION PER HR: HCPCS

## 2022-11-27 PROCEDURE — 87077 CULTURE AEROBIC IDENTIFY: CPT

## 2022-11-27 PROCEDURE — 87086 URINE CULTURE/COLONY COUNT: CPT

## 2022-11-27 PROCEDURE — 83605 ASSAY OF LACTIC ACID: CPT

## 2022-11-27 PROCEDURE — 87635 SARS-COV-2 COVID-19 AMP PRB: CPT

## 2022-11-27 PROCEDURE — 94761 N-INVAS EAR/PLS OXIMETRY MLT: CPT

## 2022-11-27 PROCEDURE — 71045 X-RAY EXAM CHEST 1 VIEW: CPT

## 2022-11-27 PROCEDURE — 96374 THER/PROPH/DIAG INJ IV PUSH: CPT

## 2022-11-27 PROCEDURE — 87040 BLOOD CULTURE FOR BACTERIA: CPT

## 2022-11-27 PROCEDURE — 84145 PROCALCITONIN (PCT): CPT

## 2022-11-27 PROCEDURE — 75810000275 HC EMERGENCY DEPT VISIT NO LEVEL OF CARE

## 2022-11-27 PROCEDURE — 74011250636 HC RX REV CODE- 250/636: Performed by: EMERGENCY MEDICINE

## 2022-11-27 PROCEDURE — 81001 URINALYSIS AUTO W/SCOPE: CPT

## 2022-11-27 PROCEDURE — 87804 INFLUENZA ASSAY W/OPTIC: CPT

## 2022-11-27 PROCEDURE — 74011000250 HC RX REV CODE- 250: Performed by: INTERNAL MEDICINE

## 2022-11-27 PROCEDURE — 74011636637 HC RX REV CODE- 636/637: Performed by: INTERNAL MEDICINE

## 2022-11-27 PROCEDURE — 74011250637 HC RX REV CODE- 250/637: Performed by: INTERNAL MEDICINE

## 2022-11-27 PROCEDURE — 74011000250 HC RX REV CODE- 250: Performed by: EMERGENCY MEDICINE

## 2022-11-27 PROCEDURE — 87186 SC STD MICRODIL/AGAR DIL: CPT

## 2022-11-27 PROCEDURE — 65270000029 HC RM PRIVATE

## 2022-11-27 PROCEDURE — 80048 BASIC METABOLIC PNL TOTAL CA: CPT

## 2022-11-27 RX ORDER — ACETAMINOPHEN 325 MG/1
650 TABLET ORAL
Status: DISCONTINUED | OUTPATIENT
Start: 2022-11-27 | End: 2022-12-05 | Stop reason: HOSPADM

## 2022-11-27 RX ORDER — ONDANSETRON 2 MG/ML
4 INJECTION INTRAMUSCULAR; INTRAVENOUS
Status: DISCONTINUED | OUTPATIENT
Start: 2022-11-27 | End: 2022-12-05 | Stop reason: HOSPADM

## 2022-11-27 RX ORDER — BRINZOLAMIDE/BRIMONIDINE TARTRATE 10; 2 MG/ML; MG/ML
1 SUSPENSION/ DROPS OPHTHALMIC 3 TIMES DAILY
COMMUNITY
Start: 2022-04-14

## 2022-11-27 RX ORDER — CHOLECALCIFEROL (VITAMIN D3) 10(400)/ML
20 DROPS ORAL DAILY
COMMUNITY
Start: 2022-06-07

## 2022-11-27 RX ORDER — PYRIDOSTIGMINE BROMIDE 60 MG/1
30 TABLET ORAL 2 TIMES DAILY
COMMUNITY
Start: 2022-09-12 | End: 2023-09-12

## 2022-11-27 RX ORDER — DILTIAZEM HYDROCHLORIDE 60 MG/1
60 TABLET, FILM COATED ORAL 2 TIMES DAILY
COMMUNITY
Start: 2022-09-12 | End: 2023-09-12

## 2022-11-27 RX ORDER — ACETAMINOPHEN 650 MG/1
650 SUPPOSITORY RECTAL
Status: DISCONTINUED | OUTPATIENT
Start: 2022-11-27 | End: 2022-12-05 | Stop reason: HOSPADM

## 2022-11-27 RX ORDER — LOSARTAN POTASSIUM 25 MG/1
25 TABLET ORAL DAILY
COMMUNITY
Start: 2022-09-11

## 2022-11-27 RX ORDER — LATANOPROST 50 UG/ML
1 SOLUTION/ DROPS OPHTHALMIC EVERY EVENING
Status: DISCONTINUED | OUTPATIENT
Start: 2022-11-27 | End: 2022-12-05 | Stop reason: HOSPADM

## 2022-11-27 RX ORDER — POLYETHYLENE GLYCOL 3350 17 G/17G
17 POWDER, FOR SOLUTION ORAL DAILY PRN
Status: DISCONTINUED | OUTPATIENT
Start: 2022-11-27 | End: 2022-12-05 | Stop reason: HOSPADM

## 2022-11-27 RX ORDER — BUDESONIDE AND FORMOTEROL FUMARATE DIHYDRATE 80; 4.5 UG/1; UG/1
2 AEROSOL RESPIRATORY (INHALATION)
Status: DISCONTINUED | OUTPATIENT
Start: 2022-11-27 | End: 2022-11-28 | Stop reason: ALTCHOICE

## 2022-11-27 RX ORDER — MYCOPHENOLATE MOFETIL 200 MG/ML
1000 POWDER, FOR SUSPENSION ORAL 2 TIMES DAILY
Status: DISCONTINUED | OUTPATIENT
Start: 2022-11-27 | End: 2022-12-05 | Stop reason: HOSPADM

## 2022-11-27 RX ORDER — DORZOLAMIDE HCL 20 MG/ML
1 SOLUTION/ DROPS OPHTHALMIC 3 TIMES DAILY
Status: DISCONTINUED | OUTPATIENT
Start: 2022-11-27 | End: 2022-12-05 | Stop reason: HOSPADM

## 2022-11-27 RX ORDER — ENOXAPARIN SODIUM 100 MG/ML
30 INJECTION SUBCUTANEOUS DAILY
Status: DISCONTINUED | OUTPATIENT
Start: 2022-11-28 | End: 2022-11-27

## 2022-11-27 RX ORDER — LATANOPROST 50 UG/ML
1 SOLUTION/ DROPS OPHTHALMIC AT BEDTIME
COMMUNITY
Start: 2022-01-07 | End: 2023-01-07

## 2022-11-27 RX ORDER — SODIUM CHLORIDE 9 MG/ML
75 INJECTION, SOLUTION INTRAVENOUS CONTINUOUS
Status: DISCONTINUED | OUTPATIENT
Start: 2022-11-27 | End: 2022-12-05 | Stop reason: HOSPADM

## 2022-11-27 RX ORDER — CHOLECALCIFEROL (VITAMIN D3) 10(400)/ML
20 DROPS ORAL DAILY
Status: DISCONTINUED | OUTPATIENT
Start: 2022-11-28 | End: 2022-12-05 | Stop reason: HOSPADM

## 2022-11-27 RX ORDER — IPRATROPIUM BROMIDE AND ALBUTEROL SULFATE 2.5; .5 MG/3ML; MG/3ML
3 SOLUTION RESPIRATORY (INHALATION)
Status: DISCONTINUED | OUTPATIENT
Start: 2022-11-27 | End: 2022-12-02

## 2022-11-27 RX ORDER — BRIMONIDINE TARTRATE 2 MG/ML
1 SOLUTION/ DROPS OPHTHALMIC 3 TIMES DAILY
Status: DISCONTINUED | OUTPATIENT
Start: 2022-11-27 | End: 2022-12-05 | Stop reason: HOSPADM

## 2022-11-27 RX ORDER — SODIUM CHLORIDE 0.9 % (FLUSH) 0.9 %
5-40 SYRINGE (ML) INJECTION EVERY 8 HOURS
Status: DISCONTINUED | OUTPATIENT
Start: 2022-11-27 | End: 2022-12-01

## 2022-11-27 RX ORDER — PREDNISONE 20 MG/1
20 TABLET ORAL 2 TIMES DAILY WITH MEALS
Status: DISCONTINUED | OUTPATIENT
Start: 2022-11-27 | End: 2022-12-05 | Stop reason: HOSPADM

## 2022-11-27 RX ORDER — LOSARTAN POTASSIUM 25 MG/1
25 TABLET ORAL DAILY
Status: DISCONTINUED | OUTPATIENT
Start: 2022-11-28 | End: 2022-12-05 | Stop reason: HOSPADM

## 2022-11-27 RX ORDER — SODIUM CHLORIDE 0.9 % (FLUSH) 0.9 %
5-40 SYRINGE (ML) INJECTION AS NEEDED
Status: DISCONTINUED | OUTPATIENT
Start: 2022-11-27 | End: 2022-12-05 | Stop reason: HOSPADM

## 2022-11-27 RX ORDER — MYCOPHENOLATE MOFETIL 200 MG/ML
1000 POWDER, FOR SUSPENSION ORAL 2 TIMES DAILY
COMMUNITY
Start: 2022-09-09 | End: 2023-09-09

## 2022-11-27 RX ORDER — FLUTICASONE PROPIONATE AND SALMETEROL 100; 50 UG/1; UG/1
1 POWDER RESPIRATORY (INHALATION)
COMMUNITY
Start: 2022-09-06 | End: 2023-09-06

## 2022-11-27 RX ORDER — PROMETHAZINE HYDROCHLORIDE 25 MG/1
12.5 TABLET ORAL
Status: DISCONTINUED | OUTPATIENT
Start: 2022-11-27 | End: 2022-12-05 | Stop reason: HOSPADM

## 2022-11-27 RX ORDER — DILTIAZEM HYDROCHLORIDE 30 MG/1
60 TABLET, FILM COATED ORAL 2 TIMES DAILY
Status: DISCONTINUED | OUTPATIENT
Start: 2022-11-27 | End: 2022-12-05 | Stop reason: HOSPADM

## 2022-11-27 RX ORDER — PYRIDOSTIGMINE BROMIDE 60 MG/1
60 TABLET ORAL DAILY
Status: DISCONTINUED | OUTPATIENT
Start: 2022-11-28 | End: 2022-12-05 | Stop reason: HOSPADM

## 2022-11-27 RX ORDER — SODIUM CHLORIDE 9 MG/ML
75 INJECTION, SOLUTION INTRAVENOUS ONCE
Status: COMPLETED | OUTPATIENT
Start: 2022-11-27 | End: 2022-11-27

## 2022-11-27 RX ADMIN — SODIUM CHLORIDE 75 ML/HR: 9 INJECTION, SOLUTION INTRAVENOUS at 16:46

## 2022-11-27 RX ADMIN — CEFTRIAXONE SODIUM 1 G: 1 INJECTION, POWDER, FOR SOLUTION INTRAMUSCULAR; INTRAVENOUS at 15:39

## 2022-11-27 RX ADMIN — LATANOPROST 1 DROP: 50 SOLUTION OPHTHALMIC at 22:57

## 2022-11-27 RX ADMIN — BRIMONIDINE TARTRATE 1 DROP: 2 SOLUTION OPHTHALMIC at 22:57

## 2022-11-27 RX ADMIN — PREDNISONE 20 MG: 20 TABLET ORAL at 20:13

## 2022-11-27 RX ADMIN — DORZOLAMIDE HYDROCHLORIDE 1 DROP: 20 SOLUTION/ DROPS OPHTHALMIC at 22:57

## 2022-11-27 RX ADMIN — SODIUM CHLORIDE, PRESERVATIVE FREE 10 ML: 5 INJECTION INTRAVENOUS at 15:11

## 2022-11-27 RX ADMIN — SODIUM CHLORIDE 75 ML/HR: 9 INJECTION, SOLUTION INTRAVENOUS at 14:57

## 2022-11-27 RX ADMIN — MYCOPHENOLATE MOFETIL 1000 MG: 200 POWDER, FOR SUSPENSION ORAL at 22:57

## 2022-11-27 RX ADMIN — ACETAMINOPHEN 650 MG: 325 TABLET ORAL at 20:23

## 2022-11-27 RX ADMIN — APIXABAN 5 MG: 5 TABLET, FILM COATED ORAL at 20:12

## 2022-11-27 RX ADMIN — DILTIAZEM HYDROCHLORIDE 60 MG: 30 TABLET, FILM COATED ORAL at 20:12
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
## 2022-11-27 NOTE — ED TRIAGE NOTES
Trach patient, wheezing present. Needs to be suctioned.  Possible UTI, per patient she is burning when she urinates
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
## 2022-11-27 NOTE — Clinical Note
Status[de-identified] INPATIENT [101]   Type of Bed: Remote Telemetry [29]   Cardiac Monitoring Required?: Yes   Inpatient Hospitalization Certified Necessary for the Following Reasons: 3.  Patient receiving treatment that can only be provided in an inpatient setting (further clarification in H&P documentation)   Admitting Diagnosis: Complicated UTI (urinary tract infection) [[id number]]   Admitting Physician: Paramjit Quintero   Attending Physician: Paramjit Quintero   Estimated Length of Stay: 2 Midnights   Discharge Plan[de-identified] Home with Office Follow-up

## 2022-11-28 LAB
ANION GAP SERPL CALC-SCNC: 3 MMOL/L (ref 5–15)
BUN SERPL-MCNC: 20 MG/DL (ref 6–20)
BUN/CREAT SERPL: 34 (ref 12–20)
CA-I BLD-MCNC: 10.9 MG/DL (ref 8.5–10.1)
CHLORIDE SERPL-SCNC: 110 MMOL/L (ref 97–108)
CO2 SERPL-SCNC: 27 MMOL/L (ref 21–32)
CREAT SERPL-MCNC: 0.58 MG/DL (ref 0.55–1.02)
ERYTHROCYTE [DISTWIDTH] IN BLOOD BY AUTOMATED COUNT: 14 % (ref 11.5–14.5)
GLUCOSE SERPL-MCNC: 186 MG/DL (ref 65–100)
HCT VFR BLD AUTO: 35.8 % (ref 35–47)
HGB BLD-MCNC: 11.2 G/DL (ref 11.5–16)
MCH RBC QN AUTO: 29.6 PG (ref 26–34)
MCHC RBC AUTO-ENTMCNC: 31.3 G/DL (ref 30–36.5)
MCV RBC AUTO: 94.5 FL (ref 80–99)
NRBC # BLD: 0 K/UL (ref 0–0.01)
NRBC BLD-RTO: 0 PER 100 WBC
PLATELET # BLD AUTO: 202 K/UL (ref 150–400)
PMV BLD AUTO: 11.5 FL (ref 8.9–12.9)
POTASSIUM SERPL-SCNC: 4.1 MMOL/L (ref 3.5–5.1)
RBC # BLD AUTO: 3.79 M/UL (ref 3.8–5.2)
SODIUM SERPL-SCNC: 140 MMOL/L (ref 136–145)
WBC # BLD AUTO: 12.6 K/UL (ref 3.6–11)

## 2022-11-28 PROCEDURE — 74011250636 HC RX REV CODE- 250/636: Performed by: INTERNAL MEDICINE

## 2022-11-28 PROCEDURE — G0378 HOSPITAL OBSERVATION PER HR: HCPCS

## 2022-11-28 PROCEDURE — 74011250637 HC RX REV CODE- 250/637: Performed by: INTERNAL MEDICINE

## 2022-11-28 PROCEDURE — 80048 BASIC METABOLIC PNL TOTAL CA: CPT

## 2022-11-28 PROCEDURE — 87150 DNA/RNA AMPLIFIED PROBE: CPT

## 2022-11-28 PROCEDURE — 99284 EMERGENCY DEPT VISIT MOD MDM: CPT

## 2022-11-28 PROCEDURE — 74011636637 HC RX REV CODE- 636/637: Performed by: INTERNAL MEDICINE

## 2022-11-28 PROCEDURE — 74011000250 HC RX REV CODE- 250: Performed by: INTERNAL MEDICINE

## 2022-11-28 PROCEDURE — 65270000029 HC RM PRIVATE

## 2022-11-28 PROCEDURE — 85027 COMPLETE CBC AUTOMATED: CPT

## 2022-11-28 RX ORDER — BUDESONIDE 0.5 MG/2ML
500 INHALANT ORAL
Status: DISCONTINUED | OUTPATIENT
Start: 2022-11-29 | End: 2022-12-05 | Stop reason: HOSPADM

## 2022-11-28 RX ADMIN — DILTIAZEM HYDROCHLORIDE 60 MG: 30 TABLET, FILM COATED ORAL at 08:44

## 2022-11-28 RX ADMIN — PREDNISONE 20 MG: 20 TABLET ORAL at 16:46

## 2022-11-28 RX ADMIN — MYCOPHENOLATE MOFETIL 1000 MG: 200 POWDER, FOR SUSPENSION ORAL at 10:44

## 2022-11-28 RX ADMIN — SODIUM CHLORIDE, PRESERVATIVE FREE 10 ML: 5 INJECTION INTRAVENOUS at 20:28

## 2022-11-28 RX ADMIN — SODIUM CHLORIDE 75 ML/HR: 9 INJECTION, SOLUTION INTRAVENOUS at 07:10

## 2022-11-28 RX ADMIN — DILTIAZEM HYDROCHLORIDE 60 MG: 30 TABLET, FILM COATED ORAL at 20:25

## 2022-11-28 RX ADMIN — DORZOLAMIDE HYDROCHLORIDE 1 DROP: 20 SOLUTION/ DROPS OPHTHALMIC at 16:17

## 2022-11-28 RX ADMIN — LATANOPROST 1 DROP: 50 SOLUTION OPHTHALMIC at 17:23

## 2022-11-28 RX ADMIN — SODIUM CHLORIDE, PRESERVATIVE FREE 10 ML: 5 INJECTION INTRAVENOUS at 05:12

## 2022-11-28 RX ADMIN — BRIMONIDINE TARTRATE 1 DROP: 2 SOLUTION OPHTHALMIC at 16:17

## 2022-11-28 RX ADMIN — BRIMONIDINE TARTRATE 1 DROP: 2 SOLUTION OPHTHALMIC at 08:51

## 2022-11-28 RX ADMIN — PREDNISONE 20 MG: 20 TABLET ORAL at 08:45

## 2022-11-28 RX ADMIN — BRIMONIDINE TARTRATE 1 DROP: 2 SOLUTION OPHTHALMIC at 20:26

## 2022-11-28 RX ADMIN — LOSARTAN POTASSIUM 25 MG: 25 TABLET, FILM COATED ORAL at 08:45

## 2022-11-28 RX ADMIN — SODIUM CHLORIDE, PRESERVATIVE FREE 10 ML: 5 INJECTION INTRAVENOUS at 13:53

## 2022-11-28 RX ADMIN — MYCOPHENOLATE MOFETIL 1000 MG: 200 POWDER, FOR SUSPENSION ORAL at 21:44

## 2022-11-28 RX ADMIN — CEFTRIAXONE SODIUM 1 G: 1 INJECTION, POWDER, FOR SOLUTION INTRAMUSCULAR; INTRAVENOUS at 09:05

## 2022-11-28 RX ADMIN — PYRIDOSTIGMINE BROMIDE 60 MG: 60 TABLET ORAL at 08:45

## 2022-11-28 RX ADMIN — SODIUM CHLORIDE, PRESERVATIVE FREE 10 ML: 5 INJECTION INTRAVENOUS at 05:13

## 2022-11-28 RX ADMIN — APIXABAN 5 MG: 5 TABLET, FILM COATED ORAL at 08:45

## 2022-11-28 RX ADMIN — DORZOLAMIDE HYDROCHLORIDE 1 DROP: 20 SOLUTION/ DROPS OPHTHALMIC at 20:26

## 2022-11-28 RX ADMIN — SODIUM CHLORIDE 75 ML/HR: 9 INJECTION, SOLUTION INTRAVENOUS at 21:33

## 2022-11-28 RX ADMIN — APIXABAN 5 MG: 5 TABLET, FILM COATED ORAL at 20:25

## 2022-11-28 RX ADMIN — DORZOLAMIDE HYDROCHLORIDE 1 DROP: 20 SOLUTION/ DROPS OPHTHALMIC at 08:51

## 2022-11-28 RX ADMIN — Medication 20 MCG: at 10:44

## 2022-11-28 NOTE — PROGRESS NOTES
Problem: Pressure Injury - Risk of  Goal: *Prevention of pressure injury  Description: Document Korey Scale and appropriate interventions in the flowsheet. Outcome: Progressing Towards Goal  Note: Pressure Injury Interventions:  Sensory Interventions: Assess changes in LOC, Assess need for specialty bed, Avoid rigorous massage over bony prominences, Chair cushion         Activity Interventions: Increase time out of bed    Mobility Interventions: Assess need for specialty bed, Chair cushion, Float heels    Nutrition Interventions: Document food/fluid/supplement intake, Offer support with meals,snacks and hydration    Friction and Shear Interventions: Apply protective barrier, creams and emollients                Problem: Patient Education: Go to Patient Education Activity  Goal: Patient/Family Education  Outcome: Progressing Towards Goal     Problem: Falls - Risk of  Goal: *Absence of Falls  Description: Document Li Ground Fall Risk and appropriate interventions in the flowsheet.   Outcome: Progressing Towards Goal  Note: Fall Risk Interventions:  Mobility Interventions: Bed/chair exit alarm              Elimination Interventions: Bed/chair exit alarm, Call light in reach              Problem: Patient Education: Go to Patient Education Activity  Goal: Patient/Family Education  Outcome: Progressing Towards Goal
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
## 2022-11-28 NOTE — PROGRESS NOTES
PT consult received and acknowledged . However, per nsg, pt not appropriate for PT eval at this time and requested to return at a  later time. PT will continue to monitor and reattempt for PT eval when medically appropriate .  Thanks

## 2022-11-28 NOTE — PROGRESS NOTES
14:30: CM received a phone call from Brook PACHECO with [address] ([phone]) requesting an update on patient and her discharge plans. CM updated APS worker- patient has requested to discharge to a SNF. She would like to go to a SNF in the [place] or [place]. Choice letter completed and referrals sent via [facility] [address]. CM will continue to follow. 12:00: BOYD and patient's nurses, Bernie Blanton and Dillon Sheldon attempted to call patient's [facility] ([phone]) to discuss patient's care and discharge plans. Unable to reach her, but left a detailed message including call back number. CM will continue to follow.
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
## 2022-11-28 NOTE — PROGRESS NOTES
Problem: Pressure Injury - Risk of  Goal: *Prevention of pressure injury  Description: Document Korey Scale and appropriate interventions in the flowsheet. Outcome: Progressing Towards Goal  Note: Pressure Injury Interventions:  Sensory Interventions: Assess need for specialty bed, Keep linens dry and wrinkle-free, Minimize linen layers    Moisture Interventions: Absorbent underpads, Check for incontinence Q2 hours and as needed, Internal/External urinary devices    Activity Interventions: Increase time out of bed    Mobility Interventions: Assess need for specialty bed, HOB 30 degrees or less    Nutrition Interventions: Document food/fluid/supplement intake    Friction and Shear Interventions: HOB 30 degrees or less, Minimize layers                Problem: Falls - Risk of  Goal: *Absence of Falls  Description: Document Vitor Fall Risk and appropriate interventions in the flowsheet.   Outcome: Progressing Towards Goal  Note: Fall Risk Interventions:  Mobility Interventions: Bed/chair exit alarm         Medication Interventions: Bed/chair exit alarm    Elimination Interventions: Bed/chair exit alarm, Call light in reach, Patient to call for help with toileting needs
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
## 2022-11-28 NOTE — PROGRESS NOTES
Medicare Outpatient Observation Notice (MOON)/ [place] Outpatient Observation Notice (Steph Maurer) provided to patient/representative with verbal explanation of the notice. Time allotted for questions regarding the notice. Patient /representative provided a completed copy of the MOON/VOON notice. Copy placed on bedside chart.

## 2022-11-29 LAB
ACC. NO. FROM MICRO ORDER, ACCP: ABNORMAL
ACINETOBACTER CALCOACETICUS-BAUMANII COMPLEX, ACBCX: NOT DETECTED
BACTEROIDES FRAGILIS, BFRA: NOT DETECTED
BIOFIRE COMMENT, BCIDPF: ABNORMAL
C GLABRATA DNA VAG QL NAA+PROBE: NOT DETECTED
CANDIDA ALBICANS: NOT DETECTED
CANDIDA AURIS, CAAU: NOT DETECTED
CANDIDA KRUSEI, CKRP: NOT DETECTED
CANDIDA PARAPSILOSIS, CPAUP: NOT DETECTED
CANDIDA TROPICALIS, CTROP: NOT DETECTED
CRYPTO NEOFORMANS/GATTII, CRYNEG: NOT DETECTED
CTX-M (ESBL RESISTANT GENE), CTX: NOT DETECTED
ENTEROBACTER CLOACAE COMPLEX, ECCP: NOT DETECTED
ENTEROBACTERALES SP. , ENBLS: DETECTED
ENTEROCOCCUS FAECALIS, ENFA: NOT DETECTED
ENTEROCOCCUS FAECIUM, ENFAM: NOT DETECTED
ESCHERICHIA COLI: DETECTED
HAEMOPHILUS INFLUENZAE, HMI: NOT DETECTED
IMP (CARBAPENEMASE RESISTANT GENE), IMPC: NOT DETECTED
KLEBSIELLA AEROGENES, KLAE: NOT DETECTED
KLEBSIELLA OXYTOCA: NOT DETECTED
KLEBSIELLA PNEUMONIAE GROUP, KPPG: NOT DETECTED
KPC (CARBAPENEM RESISTANCE GENE): NOT DETECTED
LISTERIA MONOCYTOGENES, LMONP: NOT DETECTED
MCR-1 (COLISTIN RESISTANT GENE), MCR: NOT DETECTED
NDM (CARBAPENEMASE RESISTANT GENE), NDM: NOT DETECTED
NEISSERIA MENINGITIDIS, NMNI: NOT DETECTED
OXA-48-LIKE (CARBAPENEMASE RESISTANT GENE), OXA48: NOT DETECTED
PROTEUS, PRP: NOT DETECTED
PSEUDOMONAS AERUGINOSA: NOT DETECTED
RESISTANT GENE SPACE, REGENE: ABNORMAL
SALMONELLA, SALMO: NOT DETECTED
SERRATIA MARCESCENS: NOT DETECTED
STAPH EPIDERMIDIS, STEP: NOT DETECTED
STAPH LUGDUNENSIS, STALUG: NOT DETECTED
STAPHYLOCOCCUS AUREUS: NOT DETECTED
STAPHYLOCOCCUS, STAPP: NOT DETECTED
STENO MALTOPHILIA, STMA: NOT DETECTED
STREPTOCOCCUS , STPSP: NOT DETECTED
STREPTOCOCCUS AGALACTIAE (GROUP B): NOT DETECTED
STREPTOCOCCUS PNEUMONIAE , SPNP: NOT DETECTED
STREPTOCOCCUS PYOGENES (GROUP A), SPYOP: NOT DETECTED
VIM (CARBAPENEMASE RESISTANT GENE), VIM: NOT DETECTED

## 2022-11-29 PROCEDURE — 94640 AIRWAY INHALATION TREATMENT: CPT

## 2022-11-29 PROCEDURE — 74011636637 HC RX REV CODE- 636/637: Performed by: INTERNAL MEDICINE

## 2022-11-29 PROCEDURE — 74011000250 HC RX REV CODE- 250: Performed by: INTERNAL MEDICINE

## 2022-11-29 PROCEDURE — 74011000258 HC RX REV CODE- 258: Performed by: INTERNAL MEDICINE

## 2022-11-29 PROCEDURE — 74011250636 HC RX REV CODE- 250/636: Performed by: INTERNAL MEDICINE

## 2022-11-29 PROCEDURE — 74011250637 HC RX REV CODE- 250/637: Performed by: INTERNAL MEDICINE

## 2022-11-29 PROCEDURE — G0378 HOSPITAL OBSERVATION PER HR: HCPCS

## 2022-11-29 PROCEDURE — 65270000029 HC RM PRIVATE

## 2022-11-29 RX ADMIN — LATANOPROST 1 DROP: 50 SOLUTION OPHTHALMIC at 22:38

## 2022-11-29 RX ADMIN — DORZOLAMIDE HYDROCHLORIDE 1 DROP: 20 SOLUTION/ DROPS OPHTHALMIC at 10:23

## 2022-11-29 RX ADMIN — LOSARTAN POTASSIUM 25 MG: 25 TABLET, FILM COATED ORAL at 10:23

## 2022-11-29 RX ADMIN — Medication 20 MCG: at 13:09

## 2022-11-29 RX ADMIN — PIPERACILLIN AND TAZOBACTAM 4.5 G: 4; .5 INJECTION, POWDER, FOR SOLUTION INTRAVENOUS at 10:25

## 2022-11-29 RX ADMIN — PREDNISONE 20 MG: 20 TABLET ORAL at 17:00

## 2022-11-29 RX ADMIN — APIXABAN 5 MG: 5 TABLET, FILM COATED ORAL at 10:23

## 2022-11-29 RX ADMIN — BRIMONIDINE TARTRATE 1 DROP: 2 SOLUTION OPHTHALMIC at 10:22

## 2022-11-29 RX ADMIN — MYCOPHENOLATE MOFETIL 1000 MG: 200 POWDER, FOR SUSPENSION ORAL at 13:09

## 2022-11-29 RX ADMIN — PYRIDOSTIGMINE BROMIDE 60 MG: 60 TABLET ORAL at 10:23

## 2022-11-29 RX ADMIN — DORZOLAMIDE HYDROCHLORIDE 1 DROP: 20 SOLUTION/ DROPS OPHTHALMIC at 17:21

## 2022-11-29 RX ADMIN — BUDESONIDE 500 MCG: 0.5 INHALANT ORAL at 20:01

## 2022-11-29 RX ADMIN — APIXABAN 5 MG: 5 TABLET, FILM COATED ORAL at 22:39

## 2022-11-29 RX ADMIN — DILTIAZEM HYDROCHLORIDE 60 MG: 30 TABLET, FILM COATED ORAL at 22:39

## 2022-11-29 RX ADMIN — PREDNISONE 20 MG: 20 TABLET ORAL at 10:23

## 2022-11-29 RX ADMIN — DORZOLAMIDE HYDROCHLORIDE 1 DROP: 20 SOLUTION/ DROPS OPHTHALMIC at 22:38

## 2022-11-29 RX ADMIN — PIPERACILLIN AND TAZOBACTAM 3.38 G: 3; .375 INJECTION, POWDER, FOR SOLUTION INTRAVENOUS at 17:20

## 2022-11-29 RX ADMIN — SODIUM CHLORIDE, PRESERVATIVE FREE 10 ML: 5 INJECTION INTRAVENOUS at 15:33

## 2022-11-29 RX ADMIN — BRIMONIDINE TARTRATE 1 DROP: 2 SOLUTION OPHTHALMIC at 17:21

## 2022-11-29 RX ADMIN — BRIMONIDINE TARTRATE 1 DROP: 2 SOLUTION OPHTHALMIC at 22:37

## 2022-11-29 RX ADMIN — DILTIAZEM HYDROCHLORIDE 60 MG: 30 TABLET, FILM COATED ORAL at 10:23

## 2022-11-29 RX ADMIN — BUDESONIDE 500 MCG: 0.5 INHALANT ORAL at 08:01

## 2022-11-29 RX ADMIN — SODIUM CHLORIDE, PRESERVATIVE FREE 10 ML: 5 INJECTION INTRAVENOUS at 05:07
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
## 2022-11-29 NOTE — PROGRESS NOTES
Patient suctioned x 4 with 14fr sterile cath. Has Moderate amount white loose secretions. Gown changed, repositioned in bed. Vitals stable. Denies pain.
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
## 2022-11-29 NOTE — PROGRESS NOTES
Reason for Admission:  burning with urination                      RUR Score:     12%                Plan for utilizing home health:     open with [facility]    PCP: First and Last name:  Bobby Barrios MD     Name of Practice:    Are you a current patient: Yes/No:    Approximate date of last visit:    Can you participate in a virtual visit with your PCP:                     Current Advanced Directive/Advance Care Plan: Partial Code      Healthcare Decision Maker:   Click here to complete [address] including selection of the Healthcare Decision Maker Relationship (ie \"Primary\")             Primary Decision MakerLorelee Mary White - [phone]                  Transition of Care Plan:                      CM met with patient and her daughter, Willa Closs at bedside to complete DCP assessment. Patient lives with her  and a personal care aide in a single level home accessible by a ramp. CM confirmed home address of [address] [place] [address]. Patient plans to return home at discharge. CM will continue to follow.

## 2022-11-29 NOTE — PROGRESS NOTES
Problem: Pressure Injury - Risk of  Goal: *Prevention of pressure injury  Description: Document Korey Scale and appropriate interventions in the flowsheet. Outcome: Progressing Towards Goal  Note: Pressure Injury Interventions:  Sensory Interventions: Assess changes in LOC, Discuss PT/OT consult with provider, Minimize linen layers, Maintain/enhance activity level, Keep linens dry and wrinkle-free, Turn and reposition approx. every two hours (pillows and wedges if needed)    Moisture Interventions: Internal/External urinary devices, Check for incontinence Q2 hours and as needed    Activity Interventions: PT/OT evaluation, Assess need for specialty bed    Mobility Interventions: Float heels, PT/OT evaluation, Turn and reposition approx. every two hours(pillow and wedges)    Nutrition Interventions: Document food/fluid/supplement intake    Friction and Shear Interventions: Minimize layers                Problem: Falls - Risk of  Goal: *Absence of Falls  Description: Document Vitor Fall Risk and appropriate interventions in the flowsheet.   Outcome: Progressing Towards Goal  Note: Fall Risk Interventions:  Mobility Interventions: Bed/chair exit alarm         Medication Interventions: Bed/chair exit alarm    Elimination Interventions: Bed/chair exit alarm, Call light in reach

## 2022-11-30 PROCEDURE — 94640 AIRWAY INHALATION TREATMENT: CPT

## 2022-11-30 PROCEDURE — 97161 PT EVAL LOW COMPLEX 20 MIN: CPT

## 2022-11-30 PROCEDURE — 74011250637 HC RX REV CODE- 250/637: Performed by: INTERNAL MEDICINE

## 2022-11-30 PROCEDURE — 74011636637 HC RX REV CODE- 636/637: Performed by: INTERNAL MEDICINE

## 2022-11-30 PROCEDURE — 97530 THERAPEUTIC ACTIVITIES: CPT

## 2022-11-30 PROCEDURE — 74011000258 HC RX REV CODE- 258: Performed by: INTERNAL MEDICINE

## 2022-11-30 PROCEDURE — 65270000029 HC RM PRIVATE

## 2022-11-30 PROCEDURE — 97165 OT EVAL LOW COMPLEX 30 MIN: CPT

## 2022-11-30 PROCEDURE — 77010033711 HC HIGH FLOW OXYGEN

## 2022-11-30 PROCEDURE — 74011000250 HC RX REV CODE- 250: Performed by: INTERNAL MEDICINE

## 2022-11-30 PROCEDURE — 74011250636 HC RX REV CODE- 250/636: Performed by: INTERNAL MEDICINE

## 2022-11-30 RX ADMIN — Medication 20 MCG: at 08:56

## 2022-11-30 RX ADMIN — PREDNISONE 20 MG: 20 TABLET ORAL at 08:38

## 2022-11-30 RX ADMIN — DORZOLAMIDE HYDROCHLORIDE 1 DROP: 20 SOLUTION/ DROPS OPHTHALMIC at 23:08

## 2022-11-30 RX ADMIN — PYRIDOSTIGMINE BROMIDE 60 MG: 60 TABLET ORAL at 08:38

## 2022-11-30 RX ADMIN — BUDESONIDE 500 MCG: 0.5 INHALANT ORAL at 21:30

## 2022-11-30 RX ADMIN — PIPERACILLIN AND TAZOBACTAM 3.38 G: 3; .375 INJECTION, POWDER, FOR SOLUTION INTRAVENOUS at 16:14

## 2022-11-30 RX ADMIN — MYCOPHENOLATE MOFETIL 1000 MG: 200 POWDER, FOR SUSPENSION ORAL at 23:07

## 2022-11-30 RX ADMIN — SODIUM CHLORIDE, PRESERVATIVE FREE 10 ML: 5 INJECTION INTRAVENOUS at 23:10

## 2022-11-30 RX ADMIN — SODIUM CHLORIDE, PRESERVATIVE FREE 10 ML: 5 INJECTION INTRAVENOUS at 06:13

## 2022-11-30 RX ADMIN — MYCOPHENOLATE MOFETIL 1000 MG: 200 POWDER, FOR SUSPENSION ORAL at 00:03

## 2022-11-30 RX ADMIN — LATANOPROST 1 DROP: 50 SOLUTION OPHTHALMIC at 17:05

## 2022-11-30 RX ADMIN — DORZOLAMIDE HYDROCHLORIDE 1 DROP: 20 SOLUTION/ DROPS OPHTHALMIC at 16:21

## 2022-11-30 RX ADMIN — PIPERACILLIN AND TAZOBACTAM 3.38 G: 3; .375 INJECTION, POWDER, FOR SOLUTION INTRAVENOUS at 00:00

## 2022-11-30 RX ADMIN — SODIUM CHLORIDE, PRESERVATIVE FREE 10 ML: 5 INJECTION INTRAVENOUS at 00:03

## 2022-11-30 RX ADMIN — APIXABAN 5 MG: 5 TABLET, FILM COATED ORAL at 23:05

## 2022-11-30 RX ADMIN — SODIUM CHLORIDE 75 ML/HR: 9 INJECTION, SOLUTION INTRAVENOUS at 08:31

## 2022-11-30 RX ADMIN — PIPERACILLIN AND TAZOBACTAM 3.38 G: 3; .375 INJECTION, POWDER, FOR SOLUTION INTRAVENOUS at 08:31

## 2022-11-30 RX ADMIN — BUDESONIDE 500 MCG: 0.5 INHALANT ORAL at 08:12

## 2022-11-30 RX ADMIN — BRIMONIDINE TARTRATE 1 DROP: 2 SOLUTION OPHTHALMIC at 16:21

## 2022-11-30 RX ADMIN — MYCOPHENOLATE MOFETIL 1000 MG: 200 POWDER, FOR SUSPENSION ORAL at 08:38

## 2022-11-30 RX ADMIN — BRIMONIDINE TARTRATE 1 DROP: 2 SOLUTION OPHTHALMIC at 23:09

## 2022-11-30 RX ADMIN — SODIUM CHLORIDE, PRESERVATIVE FREE 10 ML: 5 INJECTION INTRAVENOUS at 13:04

## 2022-11-30 RX ADMIN — BRIMONIDINE TARTRATE 1 DROP: 2 SOLUTION OPHTHALMIC at 08:42

## 2022-11-30 RX ADMIN — PREDNISONE 20 MG: 20 TABLET ORAL at 16:13

## 2022-11-30 RX ADMIN — PIPERACILLIN AND TAZOBACTAM 3.38 G: 3; .375 INJECTION, POWDER, FOR SOLUTION INTRAVENOUS at 23:30

## 2022-11-30 RX ADMIN — LOSARTAN POTASSIUM 25 MG: 25 TABLET, FILM COATED ORAL at 08:38

## 2022-11-30 RX ADMIN — DILTIAZEM HYDROCHLORIDE 60 MG: 30 TABLET, FILM COATED ORAL at 08:38

## 2022-11-30 RX ADMIN — DORZOLAMIDE HYDROCHLORIDE 1 DROP: 20 SOLUTION/ DROPS OPHTHALMIC at 08:42

## 2022-11-30 RX ADMIN — DILTIAZEM HYDROCHLORIDE 60 MG: 30 TABLET, FILM COATED ORAL at 23:05

## 2022-11-30 RX ADMIN — APIXABAN 5 MG: 5 TABLET, FILM COATED ORAL at 08:38
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
## 2022-11-30 NOTE — PROGRESS NOTES
Comprehensive Nutrition Assessment    Type and Reason for Visit: Positive nutrition screen (TF)    Nutrition Recommendations/Plan:   NPO  Continue regimen of Jevity 1.5 via PEG 237mL 5x/day f/b 240mL H2O - provides 1778kcal (100%), 76g protein (>100%), 2100mL H2O (>100%)  Obtain updated measured weight as able  Monitor and record TF rate, flushes, and Bms in I/Os     Malnutrition Assessment:  Malnutrition Status:  No malnutrition (11/30/22 1138)    Context:  Acute illness     Findings of the 6 clinical characteristics of malnutrition:   Energy Intake:  No significant decrease in energy intake  Weight Loss:  No significant weight loss     Body Fat Loss:  Unable to assess,     Muscle Mass Loss:  Unable to assess,    Fluid Accumulation:  No significant fluid accumulation,     Strength:  Not performed     Nutrition Assessment:    (P) Admitted for complicated UTI. +PEG/ostomy x2.5 months. Tolerating similar TF regimen to home regimen. Labs: Na 140, K 4.1, BUN 20, Creat 0.58, Gluc 186. Meds: 0.9% NaCl, cholecalciferol, prednisone    Nutrition Related Findings:    (P) NFPE deferred. No n/v, d/c, or problems chewing/swallowing. No edema. BM 11/28. Wound Type: (P) None    Current Nutrition Intake & Therapies:  Average Meal Intake: (P) NPO  Average Supplement Intake: (P) NPO  ADULT TUBE FEEDING PEG; Standard with Fiber; Delivery Method: Bolus; Bolus Frequency: 5 Times Daily; Bolus Volume (mL): 237; Water Flush Volume (mL): 240; Water Flush Frequency: After bolus    Anthropometric Measures:  Height: (P) 5' 2.01\" (157.5 cm)  Ideal Body Weight (IBW): (P) 110 lbs ((P) 50 kg)  Current Body Wt:  (P) 50.3 kg (110 lb 14.3 oz), (P) 100.8 % IBW.  (P) Stated  Current BMI (kg/m2): (P) 20.3  BMI Category: (P) Underweight (BMI less than 22) age over 72    Estimated Daily Nutrient Needs:  Energy Requirements Based On: (P) Kcal/kg  Weight Used for Energy Requirements: (P) Current  Energy (kcal/day): (P) 1761kcal (35kcal/kg)  Weight Used for Protein Requirements: (P) Current  Protein (g/day): (P) 60g (1.2g/kg)  Method Used for Fluid Requirements: (P) 1 ml/kcal  Fluid (ml/day): (P) 1761mL    Nutrition Diagnosis:   (P) Inadequate oral intake related to (P) altered GI structure as evidenced by (P) nutrition support-enteral nutrition    Nutrition Interventions:   Food and/or Nutrient Delivery: (P) Continue tube feeding  Nutrition Education/Counseling: (P) No recommendations at this time  Coordination of Nutrition Care: (P) Continue to monitor while inpatient    Goals:  Goals: (P) Tolerate nutrition support at goal rate    Nutrition Monitoring and Evaluation:   Behavioral-Environmental Outcomes: (P) None identified  Food/Nutrient Intake Outcomes: (P) Enteral nutrition intake/tolerance  Physical Signs/Symptoms Outcomes: (P) GI status, Weight    Discharge Planning:    (P) Too soon to determine    Noy Stevens RD  Contact: [phone]

## 2022-11-30 NOTE — PROGRESS NOTES
Problem: Pressure Injury - Risk of  Goal: *Prevention of pressure injury  Description: Document Korey Scale and appropriate interventions in the flowsheet. Outcome: Progressing Towards Goal  Note: Pressure Injury Interventions:  Sensory Interventions: Assess changes in LOC, Keep linens dry and wrinkle-free, Minimize linen layers    Moisture Interventions: Absorbent underpads, Apply protective barrier, creams and emollients, Internal/External urinary devices, Minimize layers    Activity Interventions: Pressure redistribution bed/mattress(bed type)    Mobility Interventions: Pressure redistribution bed/mattress (bed type)    Nutrition Interventions: Document food/fluid/supplement intake    Friction and Shear Interventions: Apply protective barrier, creams and emollients, Minimize layers                Problem: Falls - Risk of  Goal: *Absence of Falls  Description: Document Vitor Fall Risk and appropriate interventions in the flowsheet.   Outcome: Progressing Towards Goal  Note: Fall Risk Interventions:  Mobility Interventions: Patient to call before getting OOB, Bed/chair exit alarm         Medication Interventions: Bed/chair exit alarm, Patient to call before getting OOB    Elimination Interventions: Bed/chair exit alarm, Call light in reach

## 2022-11-30 NOTE — PROGRESS NOTES
Problem: Pressure Injury - Risk of  Goal: *Prevention of pressure injury  Description: Document Korey Scale and appropriate interventions in the flowsheet. Outcome: Progressing Towards Goal  Note: Pressure Injury Interventions:  Sensory Interventions: Float heels, Maintain/enhance activity level, Minimize linen layers    Moisture Interventions: Absorbent underpads, Minimize layers    Activity Interventions: PT/OT evaluation, Increase time out of bed    Mobility Interventions: HOB 30 degrees or less    Nutrition Interventions: Document food/fluid/supplement intake    Friction and Shear Interventions: Transferring/repositioning devices, Minimize layers, HOB 30 degrees or less                Problem: Patient Education: Go to Patient Education Activity  Goal: Patient/Family Education  Outcome: Progressing Towards Goal     Problem: Falls - Risk of  Goal: *Absence of Falls  Description: Document Vitor Fall Risk and appropriate interventions in the flowsheet.   Outcome: Progressing Towards Goal  Note: Fall Risk Interventions:  Mobility Interventions: Bed/chair exit alarm         Medication Interventions: Bed/chair exit alarm    Elimination Interventions: Call light in reach, Bed/chair exit alarm
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
## 2022-12-01 LAB
ALBUMIN SERPL-MCNC: 3.4 G/DL (ref 3.5–5)
ALBUMIN SERPL-MCNC: 3.7 G/DL (ref 3.5–5)
ANION GAP SERPL CALC-SCNC: 4 MMOL/L (ref 5–15)
ANION GAP SERPL CALC-SCNC: 6 MMOL/L (ref 5–15)
BACTERIA SPEC CULT: ABNORMAL
BASOPHILS # BLD: 0 K/UL (ref 0–0.1)
BASOPHILS NFR BLD: 0 % (ref 0–1)
BNP SERPL-MCNC: 77 PG/ML
BUN SERPL-MCNC: 17 MG/DL (ref 6–20)
BUN SERPL-MCNC: 18 MG/DL (ref 6–20)
BUN/CREAT SERPL: 32 (ref 12–20)
BUN/CREAT SERPL: 33 (ref 12–20)
CA-I BLD-MCNC: 11.2 MG/DL (ref 8.5–10.1)
CA-I BLD-MCNC: 11.2 MG/DL (ref 8.5–10.1)
CHLORIDE SERPL-SCNC: 105 MMOL/L (ref 97–108)
CHLORIDE SERPL-SCNC: 107 MMOL/L (ref 97–108)
CO2 SERPL-SCNC: 28 MMOL/L (ref 21–32)
CO2 SERPL-SCNC: 28 MMOL/L (ref 21–32)
COLONY COUNT,CNT: ABNORMAL
CREAT SERPL-MCNC: 0.52 MG/DL (ref 0.55–1.02)
CREAT SERPL-MCNC: 0.57 MG/DL (ref 0.55–1.02)
DIFFERENTIAL METHOD BLD: ABNORMAL
EOSINOPHIL # BLD: 0 K/UL (ref 0–0.4)
EOSINOPHIL NFR BLD: 0 % (ref 0–7)
ERYTHROCYTE [DISTWIDTH] IN BLOOD BY AUTOMATED COUNT: 13.8 % (ref 11.5–14.5)
GLUCOSE BLD STRIP.AUTO-MCNC: 139 MG/DL (ref 65–100)
GLUCOSE SERPL-MCNC: 101 MG/DL (ref 65–100)
GLUCOSE SERPL-MCNC: 121 MG/DL (ref 65–100)
HCT VFR BLD AUTO: 41 % (ref 35–47)
HGB BLD-MCNC: 12.7 G/DL (ref 11.5–16)
IMM GRANULOCYTES # BLD AUTO: 0.2 K/UL (ref 0–0.04)
IMM GRANULOCYTES NFR BLD AUTO: 1 % (ref 0–0.5)
LYMPHOCYTES # BLD: 0.7 K/UL (ref 0.8–3.5)
LYMPHOCYTES NFR BLD: 5 % (ref 12–49)
MAGNESIUM SERPL-MCNC: 2.4 MG/DL (ref 1.6–2.4)
MCH RBC QN AUTO: 29.5 PG (ref 26–34)
MCHC RBC AUTO-ENTMCNC: 31 G/DL (ref 30–36.5)
MCV RBC AUTO: 95.1 FL (ref 80–99)
MONOCYTES # BLD: 0.4 K/UL (ref 0–1)
MONOCYTES NFR BLD: 3 % (ref 5–13)
NEUTS SEG # BLD: 12.6 K/UL (ref 1.8–8)
NEUTS SEG NFR BLD: 91 % (ref 32–75)
NRBC # BLD: 0 K/UL (ref 0–0.01)
NRBC BLD-RTO: 0 PER 100 WBC
PERFORMED BY, TECHID: ABNORMAL
PHOSPHATE SERPL-MCNC: 1.9 MG/DL (ref 2.6–4.7)
PHOSPHATE SERPL-MCNC: 2 MG/DL (ref 2.6–4.7)
PLATELET # BLD AUTO: 303 K/UL (ref 150–400)
PMV BLD AUTO: 10.5 FL (ref 8.9–12.9)
POTASSIUM SERPL-SCNC: 3.8 MMOL/L (ref 3.5–5.1)
POTASSIUM SERPL-SCNC: 3.9 MMOL/L (ref 3.5–5.1)
PROCALCITONIN SERPL-MCNC: <0.05 NG/ML
RBC # BLD AUTO: 4.31 M/UL (ref 3.8–5.2)
SODIUM SERPL-SCNC: 139 MMOL/L (ref 136–145)
SODIUM SERPL-SCNC: 139 MMOL/L (ref 136–145)
SPECIAL REQUESTS,SREQ: ABNORMAL
SPECIAL REQUESTS,SREQ: ABNORMAL
WBC # BLD AUTO: 13.9 K/UL (ref 3.6–11)

## 2022-12-01 PROCEDURE — 74011636637 HC RX REV CODE- 636/637: Performed by: INTERNAL MEDICINE

## 2022-12-01 PROCEDURE — 77010033711 HC HIGH FLOW OXYGEN

## 2022-12-01 PROCEDURE — 74011250637 HC RX REV CODE- 250/637: Performed by: HOSPITALIST

## 2022-12-01 PROCEDURE — 94761 N-INVAS EAR/PLS OXIMETRY MLT: CPT

## 2022-12-01 PROCEDURE — 82962 GLUCOSE BLOOD TEST: CPT

## 2022-12-01 PROCEDURE — 94640 AIRWAY INHALATION TREATMENT: CPT

## 2022-12-01 PROCEDURE — 74011000250 HC RX REV CODE- 250: Performed by: INTERNAL MEDICINE

## 2022-12-01 PROCEDURE — 80069 RENAL FUNCTION PANEL: CPT

## 2022-12-01 PROCEDURE — 74011000258 HC RX REV CODE- 258: Performed by: INTERNAL MEDICINE

## 2022-12-01 PROCEDURE — 84145 PROCALCITONIN (PCT): CPT

## 2022-12-01 PROCEDURE — 77010026065 HC OXYGEN MINIMUM MEDICAL AIR

## 2022-12-01 PROCEDURE — 65270000029 HC RM PRIVATE

## 2022-12-01 PROCEDURE — 83880 ASSAY OF NATRIURETIC PEPTIDE: CPT

## 2022-12-01 PROCEDURE — 74011250636 HC RX REV CODE- 250/636: Performed by: INTERNAL MEDICINE

## 2022-12-01 PROCEDURE — 83735 ASSAY OF MAGNESIUM: CPT

## 2022-12-01 PROCEDURE — 74011250637 HC RX REV CODE- 250/637: Performed by: INTERNAL MEDICINE

## 2022-12-01 PROCEDURE — 36415 COLL VENOUS BLD VENIPUNCTURE: CPT

## 2022-12-01 PROCEDURE — 85025 COMPLETE CBC W/AUTO DIFF WBC: CPT

## 2022-12-01 PROCEDURE — 74011000250 HC RX REV CODE- 250: Performed by: HOSPITALIST

## 2022-12-01 RX ORDER — SODIUM CHLORIDE FOR INHALATION 0.9 %
2.5 VIAL, NEBULIZER (ML) INHALATION AS NEEDED
Status: DISCONTINUED | OUTPATIENT
Start: 2022-12-01 | End: 2022-12-05 | Stop reason: HOSPADM

## 2022-12-01 RX ORDER — ALBUTEROL SULFATE 2.5 MG/.5ML
2.5 SOLUTION RESPIRATORY (INHALATION)
Status: DISCONTINUED | OUTPATIENT
Start: 2022-12-01 | End: 2022-12-04

## 2022-12-01 RX ADMIN — IPRATROPIUM BROMIDE AND ALBUTEROL SULFATE 3 ML: .5; 2.5 SOLUTION RESPIRATORY (INHALATION) at 09:09

## 2022-12-01 RX ADMIN — Medication 20 MCG: at 09:42

## 2022-12-01 RX ADMIN — ALBUTEROL SULFATE 2.5 MG: 2.5 SOLUTION RESPIRATORY (INHALATION) at 20:09

## 2022-12-01 RX ADMIN — DILTIAZEM HYDROCHLORIDE 60 MG: 30 TABLET, FILM COATED ORAL at 09:45

## 2022-12-01 RX ADMIN — MYCOPHENOLATE MOFETIL 1000 MG: 200 POWDER, FOR SUSPENSION ORAL at 22:47

## 2022-12-01 RX ADMIN — DORZOLAMIDE HYDROCHLORIDE 1 DROP: 20 SOLUTION/ DROPS OPHTHALMIC at 09:43

## 2022-12-01 RX ADMIN — BRIMONIDINE TARTRATE 1 DROP: 2 SOLUTION OPHTHALMIC at 09:44

## 2022-12-01 RX ADMIN — DIBASIC SODIUM PHOSPHATE, MONOBASIC POTASSIUM PHOSPHATE AND MONOBASIC SODIUM PHOSPHATE 2 TABLET: 852; 155; 130 TABLET ORAL at 17:25

## 2022-12-01 RX ADMIN — DILTIAZEM HYDROCHLORIDE 60 MG: 30 TABLET, FILM COATED ORAL at 22:46

## 2022-12-01 RX ADMIN — BUDESONIDE 500 MCG: 0.5 INHALANT ORAL at 09:09

## 2022-12-01 RX ADMIN — DIBASIC SODIUM PHOSPHATE, MONOBASIC POTASSIUM PHOSPHATE AND MONOBASIC SODIUM PHOSPHATE 2 TABLET: 852; 155; 130 TABLET ORAL at 22:47

## 2022-12-01 RX ADMIN — PIPERACILLIN AND TAZOBACTAM 3.38 G: 3; .375 INJECTION, POWDER, FOR SOLUTION INTRAVENOUS at 17:25

## 2022-12-01 RX ADMIN — IPRATROPIUM BROMIDE AND ALBUTEROL SULFATE 3 ML: .5; 2.5 SOLUTION RESPIRATORY (INHALATION) at 23:56

## 2022-12-01 RX ADMIN — DORZOLAMIDE HYDROCHLORIDE 1 DROP: 20 SOLUTION/ DROPS OPHTHALMIC at 23:04

## 2022-12-01 RX ADMIN — BUDESONIDE 500 MCG: 0.5 INHALANT ORAL at 20:09

## 2022-12-01 RX ADMIN — PIPERACILLIN AND TAZOBACTAM 3.38 G: 3; .375 INJECTION, POWDER, FOR SOLUTION INTRAVENOUS at 09:41

## 2022-12-01 RX ADMIN — BRIMONIDINE TARTRATE 1 DROP: 2 SOLUTION OPHTHALMIC at 23:04

## 2022-12-01 RX ADMIN — SODIUM CHLORIDE 75 ML/HR: 9 INJECTION, SOLUTION INTRAVENOUS at 09:41

## 2022-12-01 RX ADMIN — PYRIDOSTIGMINE BROMIDE 60 MG: 60 TABLET ORAL at 09:43

## 2022-12-01 RX ADMIN — APIXABAN 5 MG: 5 TABLET, FILM COATED ORAL at 09:43

## 2022-12-01 RX ADMIN — BRIMONIDINE TARTRATE 1 DROP: 2 SOLUTION OPHTHALMIC at 17:26

## 2022-12-01 RX ADMIN — SODIUM CHLORIDE, PRESERVATIVE FREE 10 ML: 5 INJECTION INTRAVENOUS at 06:27

## 2022-12-01 RX ADMIN — PREDNISONE 20 MG: 20 TABLET ORAL at 09:43

## 2022-12-01 RX ADMIN — PREDNISONE 20 MG: 20 TABLET ORAL at 17:25

## 2022-12-01 RX ADMIN — DORZOLAMIDE HYDROCHLORIDE 1 DROP: 20 SOLUTION/ DROPS OPHTHALMIC at 17:26

## 2022-12-01 RX ADMIN — APIXABAN 5 MG: 5 TABLET, FILM COATED ORAL at 22:47

## 2022-12-01 RX ADMIN — LOSARTAN POTASSIUM 25 MG: 25 TABLET, FILM COATED ORAL at 09:43

## 2022-12-01 RX ADMIN — IPRATROPIUM BROMIDE AND ALBUTEROL SULFATE 3 ML: .5; 2.5 SOLUTION RESPIRATORY (INHALATION) at 17:50

## 2022-12-01 RX ADMIN — LATANOPROST 1 DROP: 50 SOLUTION OPHTHALMIC at 17:25

## 2022-12-01 NOTE — PROGRESS NOTES
Physician Progress Note      PATIENTYola Juarez  CSN #:                  [id number]  :                       1955  ADMIT DATE:       2022 12:35 PM  DISCH DATE:  RESPONDING  PROVIDER #:        Daniela REDDY MD          QUERY TEXT:    Pt admitted with wheezing and SOB. Pt noted to have tracheostomy. If possible, please document in the progress notes and discharge summary if you are evaluating and/or treating any of the following: The medical record reflects the following:  Risk Factors: 78year old female, wheezing, shortness of breath, hx of COPD  Clinical Indicators:  ED provider note - Tracheostomy dependent   H&P -  O2 Device: None (Room air), Tracheostomy   Attending PN -  O2 Flow Rate (L/min): 10 l/min O2 Device: Heated, Hi flow nasal cannula   Attending PN - Acute exacerbation of COPD/L basilar atelectasis  Remains on HFO 10 L  Treatment:  10 l/min O2 Device: Heated, Hi flow nasal cannula        Please email [email or web address] with any questions  Options provided:  -- Acute respiratory failure with hypoxia  -- Acute respiratory failure with hypercapnia  -- Acute respiratory failure with hypoxia and hypercapnia  -- Chronic respiratory failure with hypoxia  -- Chronic respiratory failure with hypercapnia  -- Chronic respiratory failure with hypoxia and hypercapnia  -- Acute on chronic respiratory failure with hypoxia  -- Acute on chronic respiratory failure with hypercapnia  -- Acute on chronic respiratory failure with hypoxia and hypercapnia  -- Other - I will add my own diagnosis  -- Disagree - Not applicable / Not valid  -- Disagree - Clinically unable to determine / Unknown  -- Refer to Clinical Documentation Reviewer    PROVIDER RESPONSE TEXT:    This patient is in acute respiratory failure with hypoxia. Query created by:  Segundo Lee on 2022 11:27 AM      Electronically signed by:  Jozef Jo MD 2022 2:11 PM

## 2022-12-01 NOTE — PROGRESS NOTES
CM reviewed chart. Patient's discharge disposition is to return home,where she has personal care aides. CM will continue to follow.
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
## 2022-12-01 NOTE — PROGRESS NOTES
Problem: Pressure Injury - Risk of  Goal: *Prevention of pressure injury  Description: Document Korey Scale and appropriate interventions in the flowsheet. Outcome: Progressing Towards Goal  Note: Pressure Injury Interventions:  Sensory Interventions: Assess changes in LOC    Moisture Interventions: Absorbent underpads    Activity Interventions: Pressure redistribution bed/mattress(bed type)    Mobility Interventions: Pressure redistribution bed/mattress (bed type)    Nutrition Interventions: Document food/fluid/supplement intake    Friction and Shear Interventions: Apply protective barrier, creams and emollients                Problem: Falls - Risk of  Goal: *Absence of Falls  Description: Document Vitor Fall Risk and appropriate interventions in the flowsheet.   Outcome: Progressing Towards Goal  Note: Fall Risk Interventions:  Mobility Interventions: Patient to call before getting OOB         Medication Interventions: Bed/chair exit alarm    Elimination Interventions: Bed/chair exit alarm

## 2022-12-02 LAB — MAGNESIUM SERPL-MCNC: 2.7 MG/DL (ref 1.6–2.4)

## 2022-12-02 PROCEDURE — 74011000250 HC RX REV CODE- 250: Performed by: INTERNAL MEDICINE

## 2022-12-02 PROCEDURE — 36415 COLL VENOUS BLD VENIPUNCTURE: CPT

## 2022-12-02 PROCEDURE — 94761 N-INVAS EAR/PLS OXIMETRY MLT: CPT

## 2022-12-02 PROCEDURE — 74011000250 HC RX REV CODE- 250: Performed by: HOSPITALIST

## 2022-12-02 PROCEDURE — 65270000029 HC RM PRIVATE

## 2022-12-02 PROCEDURE — 94669 MECHANICAL CHEST WALL OSCILL: CPT

## 2022-12-02 PROCEDURE — 74011250637 HC RX REV CODE- 250/637: Performed by: INTERNAL MEDICINE

## 2022-12-02 PROCEDURE — 77010026065 HC OXYGEN MINIMUM MEDICAL AIR

## 2022-12-02 PROCEDURE — 74011636637 HC RX REV CODE- 636/637: Performed by: INTERNAL MEDICINE

## 2022-12-02 PROCEDURE — 83735 ASSAY OF MAGNESIUM: CPT

## 2022-12-02 PROCEDURE — 74011000258 HC RX REV CODE- 258: Performed by: INTERNAL MEDICINE

## 2022-12-02 PROCEDURE — 74011250636 HC RX REV CODE- 250/636: Performed by: INTERNAL MEDICINE

## 2022-12-02 PROCEDURE — 74011250637 HC RX REV CODE- 250/637: Performed by: HOSPITALIST

## 2022-12-02 PROCEDURE — 87040 BLOOD CULTURE FOR BACTERIA: CPT

## 2022-12-02 PROCEDURE — 94640 AIRWAY INHALATION TREATMENT: CPT

## 2022-12-02 RX ORDER — ACETYLCYSTEINE 200 MG/ML
400 SOLUTION ORAL; RESPIRATORY (INHALATION)
Status: DISCONTINUED | OUTPATIENT
Start: 2022-12-02 | End: 2022-12-03

## 2022-12-02 RX ORDER — IPRATROPIUM BROMIDE AND ALBUTEROL SULFATE 2.5; .5 MG/3ML; MG/3ML
3 SOLUTION RESPIRATORY (INHALATION)
Status: DISCONTINUED | OUTPATIENT
Start: 2022-12-03 | End: 2022-12-05 | Stop reason: HOSPADM

## 2022-12-02 RX ADMIN — ACETYLCYSTEINE 400 MG: 200 INHALANT RESPIRATORY (INHALATION) at 23:58

## 2022-12-02 RX ADMIN — PIPERACILLIN AND TAZOBACTAM 3.38 G: 3; .375 INJECTION, POWDER, FOR SOLUTION INTRAVENOUS at 08:19

## 2022-12-02 RX ADMIN — BUDESONIDE 500 MCG: 0.5 INHALANT ORAL at 19:09

## 2022-12-02 RX ADMIN — PYRIDOSTIGMINE BROMIDE 60 MG: 60 TABLET ORAL at 08:19

## 2022-12-02 RX ADMIN — DILTIAZEM HYDROCHLORIDE 60 MG: 30 TABLET, FILM COATED ORAL at 21:13

## 2022-12-02 RX ADMIN — BUDESONIDE 500 MCG: 0.5 INHALANT ORAL at 09:11

## 2022-12-02 RX ADMIN — DILTIAZEM HYDROCHLORIDE 60 MG: 30 TABLET, FILM COATED ORAL at 08:19

## 2022-12-02 RX ADMIN — ALBUTEROL SULFATE 2.5 MG: 2.5 SOLUTION RESPIRATORY (INHALATION) at 15:03

## 2022-12-02 RX ADMIN — BRIMONIDINE TARTRATE 1 DROP: 2 SOLUTION OPHTHALMIC at 21:14

## 2022-12-02 RX ADMIN — Medication 2.5 ML: at 15:03

## 2022-12-02 RX ADMIN — MYCOPHENOLATE MOFETIL 1000 MG: 200 POWDER, FOR SUSPENSION ORAL at 10:02

## 2022-12-02 RX ADMIN — PREDNISONE 20 MG: 20 TABLET ORAL at 08:19

## 2022-12-02 RX ADMIN — PIPERACILLIN AND TAZOBACTAM 3.38 G: 3; .375 INJECTION, POWDER, FOR SOLUTION INTRAVENOUS at 23:35

## 2022-12-02 RX ADMIN — DIBASIC SODIUM PHOSPHATE, MONOBASIC POTASSIUM PHOSPHATE AND MONOBASIC SODIUM PHOSPHATE 2 TABLET: 852; 155; 130 TABLET ORAL at 21:13

## 2022-12-02 RX ADMIN — PIPERACILLIN AND TAZOBACTAM 3.38 G: 3; .375 INJECTION, POWDER, FOR SOLUTION INTRAVENOUS at 00:26

## 2022-12-02 RX ADMIN — APIXABAN 5 MG: 5 TABLET, FILM COATED ORAL at 08:19

## 2022-12-02 RX ADMIN — ALBUTEROL SULFATE 2.5 MG: 2.5 SOLUTION RESPIRATORY (INHALATION) at 09:11

## 2022-12-02 RX ADMIN — LATANOPROST 1 DROP: 50 SOLUTION OPHTHALMIC at 17:48

## 2022-12-02 RX ADMIN — BRIMONIDINE TARTRATE 1 DROP: 2 SOLUTION OPHTHALMIC at 16:09

## 2022-12-02 RX ADMIN — BRIMONIDINE TARTRATE 1 DROP: 2 SOLUTION OPHTHALMIC at 08:20

## 2022-12-02 RX ADMIN — DORZOLAMIDE HYDROCHLORIDE 1 DROP: 20 SOLUTION/ DROPS OPHTHALMIC at 16:09

## 2022-12-02 RX ADMIN — DIBASIC SODIUM PHOSPHATE, MONOBASIC POTASSIUM PHOSPHATE AND MONOBASIC SODIUM PHOSPHATE 2 TABLET: 852; 155; 130 TABLET ORAL at 08:19

## 2022-12-02 RX ADMIN — PREDNISONE 20 MG: 20 TABLET ORAL at 17:36

## 2022-12-02 RX ADMIN — DORZOLAMIDE HYDROCHLORIDE 1 DROP: 20 SOLUTION/ DROPS OPHTHALMIC at 08:20

## 2022-12-02 RX ADMIN — IPRATROPIUM BROMIDE AND ALBUTEROL SULFATE 3 ML: .5; 3 SOLUTION RESPIRATORY (INHALATION) at 23:59

## 2022-12-02 RX ADMIN — LOSARTAN POTASSIUM 25 MG: 25 TABLET, FILM COATED ORAL at 08:19

## 2022-12-02 RX ADMIN — DORZOLAMIDE HYDROCHLORIDE 1 DROP: 20 SOLUTION/ DROPS OPHTHALMIC at 21:14

## 2022-12-02 RX ADMIN — APIXABAN 5 MG: 5 TABLET, FILM COATED ORAL at 21:13

## 2022-12-02 RX ADMIN — PIPERACILLIN AND TAZOBACTAM 3.38 G: 3; .375 INJECTION, POWDER, FOR SOLUTION INTRAVENOUS at 17:36

## 2022-12-02 RX ADMIN — MYCOPHENOLATE MOFETIL 1000 MG: 200 POWDER, FOR SUSPENSION ORAL at 21:14

## 2022-12-02 RX ADMIN — DIBASIC SODIUM PHOSPHATE, MONOBASIC POTASSIUM PHOSPHATE AND MONOBASIC SODIUM PHOSPHATE 2 TABLET: 852; 155; 130 TABLET ORAL at 17:36

## 2022-12-02 RX ADMIN — ALBUTEROL SULFATE 2.5 MG: 2.5 SOLUTION RESPIRATORY (INHALATION) at 19:09

## 2022-12-02 RX ADMIN — IPRATROPIUM BROMIDE AND ALBUTEROL SULFATE 3 ML: .5; 2.5 SOLUTION RESPIRATORY (INHALATION) at 05:33

## 2022-12-02 RX ADMIN — ACETAMINOPHEN 650 MG: 325 TABLET ORAL at 08:19
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
## 2022-12-02 NOTE — PROGRESS NOTES
Patient is often anxious and presses call bell without reason. Education provided to patient on expressing needs and concerns. Patient asks for deep suctioning often but accepts that she cannot have it every hour or 2 as she sometimes requests. RN provided education on signs and symptoms to report-scant output obtained via suction, patient tolerated PEG tube feeds x 2 this shift per orders and has had no residual. Patient daughter Jono Borjas called for update at start of shift and end of shift and expressed appreciation for care and updates. Patient  Volodymyr Vidal also called for update and patient gave permission to provide verbal update to him. Ellis also expressed appreciation for update.

## 2022-12-02 NOTE — PROGRESS NOTES
Suctioned pt twice. Pt continues to ask for suctioning and breathing treatments. Established expectations with pt that she can not get continuous suctioning. Her throat is becoming irritated and she is complaining about a sore throat. Pt expresses understanding. Writer has concerns that education is not received.

## 2022-12-02 NOTE — PROGRESS NOTES
Respiratory Care Note:   Arrived in room to find patient sitting upright in bed, with no distress noted. Patient received Albuterol and pulmicort nebulizer. Breath sounds noted to be CTA. Pt indicated by giving a 'thumbs up' hand sign when asked if she was feeling ok. Patient seemed less anxious than yesterday, had smiles and denied any needs or concerns when asked. Within 30 minutes of leaving the room patient was requesting an additional treatment, with no wheezing or dyspnea noted. SPO2 98%. Patient reassured and offered repositioning for comfort. H2O bag on heated tracheal collar full.

## 2022-12-02 NOTE — PROGRESS NOTES
Spoke with pt's daughter Tessie Owens. Gave daughter update. Daughter states she will be by to visit today and is very happy with the care her mom is receiving here.

## 2022-12-02 NOTE — PROGRESS NOTES
Problem: Pressure Injury - Risk of  Goal: *Prevention of pressure injury  Description: Document Korey Scale and appropriate interventions in the flowsheet. Outcome: Progressing Towards Goal  Note: Pressure Injury Interventions:  Sensory Interventions: Minimize linen layers    Moisture Interventions: Absorbent underpads, Minimize layers, Internal/External urinary devices    Activity Interventions: PT/OT evaluation, Assess need for specialty bed    Mobility Interventions: Assess need for specialty bed    Nutrition Interventions: Document food/fluid/supplement intake, Offer support with meals,snacks and hydration    Friction and Shear Interventions: Minimize layers                Problem: Falls - Risk of  Goal: *Absence of Falls  Description: Document Vitor Fall Risk and appropriate interventions in the flowsheet.   Outcome: Progressing Towards Goal  Note: Fall Risk Interventions:  Mobility Interventions: Bed/chair exit alarm         Medication Interventions: Bed/chair exit alarm    Elimination Interventions: Bed/chair exit alarm

## 2022-12-02 NOTE — PROGRESS NOTES
Pt's daughter came to nurses station asking about pt's trach set up. Explained to family the the trach set up is correct per order. Family asked to speak to respiratory. Before I could call respiratory I asked the unit charge to step over so I could explain the situation to her. The daughter came barging out of with her finger pointed in an angry tone stating \"you don't need to be telling her nothing, I can tell her what my mom needs. \" Daughter was standing with an aggressive stance and bobbing her head. I asserted myself stating that \"I have the right to educated my supervisor to the situation at hand and it is to my discretion who I included in the situation when there are family concerns. \" The daughter continued to point her finger at me stating \"don't be bobbing your head at me, with your attitude. \". I again asserted myself in a defensive manner. Pt's daughter then yells stating \" I want her off my mom's care and do not reassign her to my mom. \" Unit charge nurse went into the room to diffuse the situation. Pt's daughter requested Heather Tanner to be called. Charge nurse called Samina Rodríguez to speak to daughter. Pt's daughter did not allow me any opportunity to help or rectify the situation before become aggressive. 5 min prior to the interaction pt's daughter and I spoke about her mothers care. Pt's daughter had no issues at that time.

## 2022-12-03 LAB
ALBUMIN SERPL-MCNC: 3.2 G/DL (ref 3.5–5)
ANION GAP SERPL CALC-SCNC: 5 MMOL/L (ref 5–15)
BUN SERPL-MCNC: 14 MG/DL (ref 6–20)
BUN/CREAT SERPL: 23 (ref 12–20)
CA-I BLD-MCNC: 10.9 MG/DL (ref 8.5–10.1)
CHLORIDE SERPL-SCNC: 111 MMOL/L (ref 97–108)
CO2 SERPL-SCNC: 28 MMOL/L (ref 21–32)
CREAT SERPL-MCNC: 0.6 MG/DL (ref 0.55–1.02)
GLUCOSE BLD STRIP.AUTO-MCNC: 111 MG/DL (ref 65–100)
GLUCOSE BLD STRIP.AUTO-MCNC: 99 MG/DL (ref 65–100)
GLUCOSE SERPL-MCNC: 98 MG/DL (ref 65–100)
MAGNESIUM SERPL-MCNC: 2.6 MG/DL (ref 1.6–2.4)
PERFORMED BY, TECHID: ABNORMAL
PERFORMED BY, TECHID: NORMAL
PHOSPHATE SERPL-MCNC: 3 MG/DL (ref 2.6–4.7)
POTASSIUM SERPL-SCNC: 4.4 MMOL/L (ref 3.5–5.1)
SODIUM SERPL-SCNC: 144 MMOL/L (ref 136–145)

## 2022-12-03 PROCEDURE — 74011000250 HC RX REV CODE- 250: Performed by: HOSPITALIST

## 2022-12-03 PROCEDURE — 77010033678 HC OXYGEN DAILY

## 2022-12-03 PROCEDURE — 74011000250 HC RX REV CODE- 250: Performed by: INTERNAL MEDICINE

## 2022-12-03 PROCEDURE — 83735 ASSAY OF MAGNESIUM: CPT

## 2022-12-03 PROCEDURE — 74011250637 HC RX REV CODE- 250/637: Performed by: HOSPITALIST

## 2022-12-03 PROCEDURE — 80069 RENAL FUNCTION PANEL: CPT

## 2022-12-03 PROCEDURE — 94669 MECHANICAL CHEST WALL OSCILL: CPT

## 2022-12-03 PROCEDURE — 65270000029 HC RM PRIVATE

## 2022-12-03 PROCEDURE — 74011250637 HC RX REV CODE- 250/637: Performed by: INTERNAL MEDICINE

## 2022-12-03 PROCEDURE — 94640 AIRWAY INHALATION TREATMENT: CPT

## 2022-12-03 PROCEDURE — 74011636637 HC RX REV CODE- 636/637: Performed by: INTERNAL MEDICINE

## 2022-12-03 PROCEDURE — 82962 GLUCOSE BLOOD TEST: CPT

## 2022-12-03 PROCEDURE — 36415 COLL VENOUS BLD VENIPUNCTURE: CPT

## 2022-12-03 PROCEDURE — 77010033711 HC HIGH FLOW OXYGEN

## 2022-12-03 PROCEDURE — 74011250636 HC RX REV CODE- 250/636: Performed by: INTERNAL MEDICINE

## 2022-12-03 PROCEDURE — 74011000258 HC RX REV CODE- 258: Performed by: INTERNAL MEDICINE

## 2022-12-03 RX ORDER — ACETYLCYSTEINE 200 MG/ML
400 SOLUTION ORAL; RESPIRATORY (INHALATION)
Status: DISCONTINUED | OUTPATIENT
Start: 2022-12-03 | End: 2022-12-04

## 2022-12-03 RX ADMIN — DORZOLAMIDE HYDROCHLORIDE 1 DROP: 20 SOLUTION/ DROPS OPHTHALMIC at 17:51

## 2022-12-03 RX ADMIN — MYCOPHENOLATE MOFETIL 1000 MG: 200 POWDER, FOR SUSPENSION ORAL at 22:01

## 2022-12-03 RX ADMIN — PREDNISONE 20 MG: 20 TABLET ORAL at 17:50

## 2022-12-03 RX ADMIN — DIBASIC SODIUM PHOSPHATE, MONOBASIC POTASSIUM PHOSPHATE AND MONOBASIC SODIUM PHOSPHATE 2 TABLET: 852; 155; 130 TABLET ORAL at 10:50

## 2022-12-03 RX ADMIN — ACETYLCYSTEINE 400 MG: 200 SOLUTION ORAL; RESPIRATORY (INHALATION) at 20:58

## 2022-12-03 RX ADMIN — PIPERACILLIN AND TAZOBACTAM 3.38 G: 3; .375 INJECTION, POWDER, FOR SOLUTION INTRAVENOUS at 10:50

## 2022-12-03 RX ADMIN — IPRATROPIUM BROMIDE AND ALBUTEROL SULFATE 3 ML: .5; 3 SOLUTION RESPIRATORY (INHALATION) at 09:06

## 2022-12-03 RX ADMIN — BUDESONIDE 500 MCG: 0.5 INHALANT ORAL at 09:06

## 2022-12-03 RX ADMIN — DILTIAZEM HYDROCHLORIDE 60 MG: 30 TABLET, FILM COATED ORAL at 10:51

## 2022-12-03 RX ADMIN — ACETYLCYSTEINE 400 MG: 200 SOLUTION ORAL; RESPIRATORY (INHALATION) at 09:06

## 2022-12-03 RX ADMIN — DIBASIC SODIUM PHOSPHATE, MONOBASIC POTASSIUM PHOSPHATE AND MONOBASIC SODIUM PHOSPHATE 2 TABLET: 852; 155; 130 TABLET ORAL at 17:50

## 2022-12-03 RX ADMIN — LOSARTAN POTASSIUM 25 MG: 25 TABLET, FILM COATED ORAL at 10:51

## 2022-12-03 RX ADMIN — IPRATROPIUM BROMIDE AND ALBUTEROL SULFATE 3 ML: .5; 3 SOLUTION RESPIRATORY (INHALATION) at 14:33

## 2022-12-03 RX ADMIN — ACETYLCYSTEINE 400 MG: 200 SOLUTION ORAL; RESPIRATORY (INHALATION) at 14:33

## 2022-12-03 RX ADMIN — BRIMONIDINE TARTRATE 1 DROP: 2 SOLUTION OPHTHALMIC at 22:03

## 2022-12-03 RX ADMIN — BUDESONIDE 500 MCG: 0.5 INHALANT ORAL at 20:58

## 2022-12-03 RX ADMIN — PIPERACILLIN AND TAZOBACTAM 3.38 G: 3; .375 INJECTION, POWDER, FOR SOLUTION INTRAVENOUS at 17:51

## 2022-12-03 RX ADMIN — DILTIAZEM HYDROCHLORIDE 60 MG: 30 TABLET, FILM COATED ORAL at 22:01

## 2022-12-03 RX ADMIN — PYRIDOSTIGMINE BROMIDE 60 MG: 60 TABLET ORAL at 10:51

## 2022-12-03 RX ADMIN — BRIMONIDINE TARTRATE 1 DROP: 2 SOLUTION OPHTHALMIC at 17:51

## 2022-12-03 RX ADMIN — DIBASIC SODIUM PHOSPHATE, MONOBASIC POTASSIUM PHOSPHATE AND MONOBASIC SODIUM PHOSPHATE 2 TABLET: 852; 155; 130 TABLET ORAL at 22:00

## 2022-12-03 RX ADMIN — DORZOLAMIDE HYDROCHLORIDE 1 DROP: 20 SOLUTION/ DROPS OPHTHALMIC at 22:03

## 2022-12-03 RX ADMIN — SODIUM CHLORIDE, PRESERVATIVE FREE 10 ML: 5 INJECTION INTRAVENOUS at 22:02

## 2022-12-03 RX ADMIN — APIXABAN 5 MG: 5 TABLET, FILM COATED ORAL at 11:00

## 2022-12-03 RX ADMIN — DORZOLAMIDE HYDROCHLORIDE 1 DROP: 20 SOLUTION/ DROPS OPHTHALMIC at 10:52

## 2022-12-03 RX ADMIN — PREDNISONE 20 MG: 20 TABLET ORAL at 10:51

## 2022-12-03 RX ADMIN — SODIUM CHLORIDE 75 ML/HR: 9 INJECTION, SOLUTION INTRAVENOUS at 06:11

## 2022-12-03 RX ADMIN — BRIMONIDINE TARTRATE 1 DROP: 2 SOLUTION OPHTHALMIC at 10:52

## 2022-12-03 RX ADMIN — MYCOPHENOLATE MOFETIL 1000 MG: 200 POWDER, FOR SUSPENSION ORAL at 10:50

## 2022-12-03 RX ADMIN — APIXABAN 5 MG: 5 TABLET, FILM COATED ORAL at 22:01

## 2022-12-03 RX ADMIN — LATANOPROST 1 DROP: 50 SOLUTION OPHTHALMIC at 17:51

## 2022-12-03 RX ADMIN — IPRATROPIUM BROMIDE AND ALBUTEROL SULFATE 3 ML: .5; 3 SOLUTION RESPIRATORY (INHALATION) at 20:58

## 2022-12-03 NOTE — PROGRESS NOTES
Patient has received 2 chest PT sessions since 0700 on 12/3/22. Patient has been suctioned per request 2 times since 0700. Patient resting well in bed with no current needs at this time. Per patients communication board she has no needs.

## 2022-12-03 NOTE — PROGRESS NOTES
Bedside and Verbal shift change report given to Whit Taylor RN (oncoming nurse) by Jaime Virk RN (offgoing nurse). Report included the following information SBAR, Kardex, Intake/Output, MAR, Accordion, and Recent Results.
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
## 2022-12-03 NOTE — PROGRESS NOTES
Problem: Pressure Injury - Risk of  Goal: *Prevention of pressure injury  Description: Document Korey Scale and appropriate interventions in the flowsheet. Outcome: Progressing Towards Goal  Note: Pressure Injury Interventions:  Sensory Interventions: Pressure redistribution bed/mattress (bed type)    Moisture Interventions: Absorbent underpads, Apply protective barrier, creams and emollients, Internal/External urinary devices    Activity Interventions: Pressure redistribution bed/mattress(bed type)    Mobility Interventions: Pressure redistribution bed/mattress (bed type), PT/OT evaluation    Nutrition Interventions: Document food/fluid/supplement intake    Friction and Shear Interventions: Minimize layers                Problem: Patient Education: Go to Patient Education Activity  Goal: Patient/Family Education  Outcome: Progressing Towards Goal     Problem: Falls - Risk of  Goal: *Absence of Falls  Description: Document Vitor Fall Risk and appropriate interventions in the flowsheet.   Outcome: Progressing Towards Goal  Note: Fall Risk Interventions:  Mobility Interventions: Bed/chair exit alarm, Communicate number of staff needed for ambulation/transfer         Medication Interventions: Bed/chair exit alarm    Elimination Interventions: Bed/chair exit alarm, Call light in reach              Problem: Patient Education: Go to Patient Education Activity  Goal: Patient/Family Education  Outcome: Progressing Towards Goal     Problem: Patient Education: Go to Patient Education Activity  Goal: Patient/Family Education  Outcome: Progressing Towards Goal     Problem: Patient Education: Go to Patient Education Activity  Goal: Patient/Family Education  Outcome: Progressing Towards Goal     Problem: Aspiration - Risk of  Goal: *Absence of aspiration  Outcome: Progressing Towards Goal     Problem: Patient Education: Go to Patient Education Activity  Goal: Patient/Family Education  Outcome: Progressing Towards Goal

## 2022-12-03 NOTE — PROGRESS NOTES
After speaking with daughter in room and  via phone, they state pt has cough assist at home and trilogy unit to wear nightly.  will bring in both units when he comes to visit tomorrow.

## 2022-12-04 ENCOUNTER — APPOINTMENT (OUTPATIENT)
Dept: GENERAL RADIOLOGY | Age: 67
DRG: 871 | End: 2022-12-04
Attending: INTERNAL MEDICINE
Payer: MEDICARE

## 2022-12-04 LAB
ALBUMIN SERPL-MCNC: 3.6 G/DL (ref 3.5–5)
ANION GAP SERPL CALC-SCNC: 6 MMOL/L (ref 5–15)
BUN SERPL-MCNC: 18 MG/DL (ref 6–20)
BUN/CREAT SERPL: 29 (ref 12–20)
CA-I BLD-MCNC: 11.3 MG/DL (ref 8.5–10.1)
CHLORIDE SERPL-SCNC: 111 MMOL/L (ref 97–108)
CO2 SERPL-SCNC: 28 MMOL/L (ref 21–32)
CREAT SERPL-MCNC: 0.63 MG/DL (ref 0.55–1.02)
GLUCOSE SERPL-MCNC: 94 MG/DL (ref 65–100)
MAGNESIUM SERPL-MCNC: 2.5 MG/DL (ref 1.6–2.4)
PHOSPHATE SERPL-MCNC: 3.2 MG/DL (ref 2.6–4.7)
POTASSIUM SERPL-SCNC: 4 MMOL/L (ref 3.5–5.1)
SODIUM SERPL-SCNC: 145 MMOL/L (ref 136–145)

## 2022-12-04 PROCEDURE — 97530 THERAPEUTIC ACTIVITIES: CPT

## 2022-12-04 PROCEDURE — 94640 AIRWAY INHALATION TREATMENT: CPT

## 2022-12-04 PROCEDURE — 97110 THERAPEUTIC EXERCISES: CPT

## 2022-12-04 PROCEDURE — 36415 COLL VENOUS BLD VENIPUNCTURE: CPT

## 2022-12-04 PROCEDURE — 80069 RENAL FUNCTION PANEL: CPT

## 2022-12-04 PROCEDURE — 94669 MECHANICAL CHEST WALL OSCILL: CPT

## 2022-12-04 PROCEDURE — 74011000250 HC RX REV CODE- 250: Performed by: INTERNAL MEDICINE

## 2022-12-04 PROCEDURE — 74011250637 HC RX REV CODE- 250/637: Performed by: INTERNAL MEDICINE

## 2022-12-04 PROCEDURE — 77010033678 HC OXYGEN DAILY

## 2022-12-04 PROCEDURE — 71045 X-RAY EXAM CHEST 1 VIEW: CPT

## 2022-12-04 PROCEDURE — 74011250636 HC RX REV CODE- 250/636: Performed by: INTERNAL MEDICINE

## 2022-12-04 PROCEDURE — 94761 N-INVAS EAR/PLS OXIMETRY MLT: CPT

## 2022-12-04 PROCEDURE — 74011250637 HC RX REV CODE- 250/637: Performed by: HOSPITALIST

## 2022-12-04 PROCEDURE — 65610000006 HC RM INTENSIVE CARE

## 2022-12-04 PROCEDURE — 74011636637 HC RX REV CODE- 636/637: Performed by: INTERNAL MEDICINE

## 2022-12-04 PROCEDURE — 83735 ASSAY OF MAGNESIUM: CPT

## 2022-12-04 PROCEDURE — 74011000250 HC RX REV CODE- 250: Performed by: HOSPITALIST

## 2022-12-04 PROCEDURE — 74011000258 HC RX REV CODE- 258: Performed by: INTERNAL MEDICINE

## 2022-12-04 RX ADMIN — MYCOPHENOLATE MOFETIL 1000 MG: 200 POWDER, FOR SUSPENSION ORAL at 21:20

## 2022-12-04 RX ADMIN — DORZOLAMIDE HYDROCHLORIDE 1 DROP: 20 SOLUTION/ DROPS OPHTHALMIC at 16:03

## 2022-12-04 RX ADMIN — IPRATROPIUM BROMIDE AND ALBUTEROL SULFATE 3 ML: .5; 3 SOLUTION RESPIRATORY (INHALATION) at 13:19

## 2022-12-04 RX ADMIN — ONDANSETRON 4 MG: 2 INJECTION INTRAMUSCULAR; INTRAVENOUS at 15:45

## 2022-12-04 RX ADMIN — LATANOPROST 1 DROP: 50 SOLUTION OPHTHALMIC at 17:15

## 2022-12-04 RX ADMIN — ACETYLCYSTEINE 400 MG: 200 SOLUTION ORAL; RESPIRATORY (INHALATION) at 13:21

## 2022-12-04 RX ADMIN — IPRATROPIUM BROMIDE AND ALBUTEROL SULFATE 3 ML: .5; 3 SOLUTION RESPIRATORY (INHALATION) at 07:18

## 2022-12-04 RX ADMIN — SODIUM CHLORIDE 75 ML/HR: 9 INJECTION, SOLUTION INTRAVENOUS at 09:40

## 2022-12-04 RX ADMIN — DORZOLAMIDE HYDROCHLORIDE 1 DROP: 20 SOLUTION/ DROPS OPHTHALMIC at 09:33

## 2022-12-04 RX ADMIN — APIXABAN 5 MG: 5 TABLET, FILM COATED ORAL at 21:19

## 2022-12-04 RX ADMIN — PREDNISONE 20 MG: 20 TABLET ORAL at 09:34

## 2022-12-04 RX ADMIN — BRIMONIDINE TARTRATE 1 DROP: 2 SOLUTION OPHTHALMIC at 09:33

## 2022-12-04 RX ADMIN — ACETYLCYSTEINE 400 MG: 200 SOLUTION ORAL; RESPIRATORY (INHALATION) at 07:17

## 2022-12-04 RX ADMIN — DILTIAZEM HYDROCHLORIDE 60 MG: 30 TABLET, FILM COATED ORAL at 09:34

## 2022-12-04 RX ADMIN — DIBASIC SODIUM PHOSPHATE, MONOBASIC POTASSIUM PHOSPHATE AND MONOBASIC SODIUM PHOSPHATE 2 TABLET: 852; 155; 130 TABLET ORAL at 09:34

## 2022-12-04 RX ADMIN — APIXABAN 5 MG: 5 TABLET, FILM COATED ORAL at 09:34

## 2022-12-04 RX ADMIN — DORZOLAMIDE HYDROCHLORIDE 1 DROP: 20 SOLUTION/ DROPS OPHTHALMIC at 21:20

## 2022-12-04 RX ADMIN — PYRIDOSTIGMINE BROMIDE 60 MG: 60 TABLET ORAL at 09:34

## 2022-12-04 RX ADMIN — IPRATROPIUM BROMIDE AND ALBUTEROL SULFATE 3 ML: .5; 3 SOLUTION RESPIRATORY (INHALATION) at 20:51

## 2022-12-04 RX ADMIN — BUDESONIDE 500 MCG: 0.5 INHALANT ORAL at 20:51

## 2022-12-04 RX ADMIN — IPRATROPIUM BROMIDE AND ALBUTEROL SULFATE 3 ML: .5; 3 SOLUTION RESPIRATORY (INHALATION) at 01:37

## 2022-12-04 RX ADMIN — ACETYLCYSTEINE 400 MG: 200 SOLUTION ORAL; RESPIRATORY (INHALATION) at 01:37

## 2022-12-04 RX ADMIN — BRIMONIDINE TARTRATE 1 DROP: 2 SOLUTION OPHTHALMIC at 21:20

## 2022-12-04 RX ADMIN — PIPERACILLIN AND TAZOBACTAM 3.38 G: 3; .375 INJECTION, POWDER, FOR SOLUTION INTRAVENOUS at 16:02

## 2022-12-04 RX ADMIN — DILTIAZEM HYDROCHLORIDE 60 MG: 30 TABLET, FILM COATED ORAL at 21:20

## 2022-12-04 RX ADMIN — LOSARTAN POTASSIUM 25 MG: 25 TABLET, FILM COATED ORAL at 09:34

## 2022-12-04 RX ADMIN — PREDNISONE 20 MG: 20 TABLET ORAL at 16:02

## 2022-12-04 RX ADMIN — BUDESONIDE 500 MCG: 0.5 INHALANT ORAL at 07:26

## 2022-12-04 RX ADMIN — PIPERACILLIN AND TAZOBACTAM 3.38 G: 3; .375 INJECTION, POWDER, FOR SOLUTION INTRAVENOUS at 00:01

## 2022-12-04 RX ADMIN — MYCOPHENOLATE MOFETIL 1000 MG: 200 POWDER, FOR SUSPENSION ORAL at 09:34

## 2022-12-04 RX ADMIN — PIPERACILLIN AND TAZOBACTAM 3.38 G: 3; .375 INJECTION, POWDER, FOR SOLUTION INTRAVENOUS at 09:34

## 2022-12-04 RX ADMIN — BRIMONIDINE TARTRATE 1 DROP: 2 SOLUTION OPHTHALMIC at 16:02

## 2022-12-04 NOTE — PROGRESS NOTES
Problem: Pressure Injury - Risk of  Goal: *Prevention of pressure injury  Description: Document Korey Scale and appropriate interventions in the flowsheet. Outcome: Progressing Towards Goal  Note: Pressure Injury Interventions:  Sensory Interventions: Pressure redistribution bed/mattress (bed type)    Moisture Interventions: Absorbent underpads, Apply protective barrier, creams and emollients, Internal/External urinary devices    Activity Interventions: Pressure redistribution bed/mattress(bed type)    Mobility Interventions: Pressure redistribution bed/mattress (bed type)    Nutrition Interventions: Document food/fluid/supplement intake    Friction and Shear Interventions: Minimize layers                Problem: Patient Education: Go to Patient Education Activity  Goal: Patient/Family Education  Outcome: Progressing Towards Goal     Problem: Falls - Risk of  Goal: *Absence of Falls  Description: Document Vitor Fall Risk and appropriate interventions in the flowsheet.   Outcome: Progressing Towards Goal  Note: Fall Risk Interventions:  Mobility Interventions: Bed/chair exit alarm, Assess mobility with egress test, Communicate number of staff needed for ambulation/transfer         Medication Interventions: Bed/chair exit alarm    Elimination Interventions: Bed/chair exit alarm, Call light in reach              Problem: Patient Education: Go to Patient Education Activity  Goal: Patient/Family Education  Outcome: Progressing Towards Goal     Problem: Patient Education: Go to Patient Education Activity  Goal: Patient/Family Education  Outcome: Progressing Towards Goal     Problem: Patient Education: Go to Patient Education Activity  Goal: Patient/Family Education  Outcome: Progressing Towards Goal     Problem: Aspiration - Risk of  Goal: *Absence of aspiration  Outcome: Progressing Towards Goal     Problem: Patient Education: Go to Patient Education Activity  Goal: Patient/Family Education  Outcome: Progressing Towards Goal

## 2022-12-04 NOTE — PROGRESS NOTES
Problem: Mobility Impaired (Adult and Pediatric)  Goal: *Acute Goals and Plan of Care (Insert Text)  Description: FUNCTIONAL STATUS PRIOR TO ADMISSION: Patient was modified independent using a rollator for functional mobility. Patient was modified independent for basic and instrumental ADLs. HOME SUPPORT PRIOR TO ADMISSION: The patient lived with  and required modified independence for self care. Physical Therapy Goals  Initiated 11/30/2022  Pt stated goal: to go home  Pt will be I with [facility] HEP in 7 days. Pt will perform bed mobility with mod I in 7 days. Pt will perform transfers with mod I in 7 days. Pt will amb  feet with LRAD safely with mod I in 7 days. Outcome: Progressing Towards Goal   PHYSICAL THERAPY TREATMENT  Patient: Geovany Parrish (19 y.o. female)  Date: 12/4/2022  Diagnosis: Complicated UTI (urinary tract infection) [N39.0]  UTI (urinary tract infection) [N39.0] <principal problem not specified>      Precautions:    Chart, physical therapy assessment, plan of care and goals were reviewed. ASSESSMENT  Patient continues with skilled PT services and is progressing towards goals. Pt received semi supine in bed upon PTA arrival, declining at first, but with minimal encouragement was agreeable to session. Pt completed bed mobility, sat on EOB and completed ex on EOB. (See below for objective details and assist levels). She decline attempt to standi and amb. Overall pt tolerated session fairly well today with good participation with ex. Using her keyboard, pt ask to be suctioned at end of Tx. Made RN aware. Pt was left semi supine in bed in NAD, call bell within reach, all needs addressed. Pt will continue to benefit from skilled PT services, and will continue to progress as tolerated.      Current Level of Function Impacting Discharge (mobility/balance): assist x 1    Other factors to consider for discharge: severity of impairments, home environment and support available, safety         PLAN :  Patient continues to benefit from skilled intervention to address the above impairments. Continue treatment per established plan of care to address goals. Recommend with staff: Encourage HEP in prep for ADLs/mobility    Recommendation for discharge: (in order for the patient to meet his/her long term goals)  Tae Timothy tiffani EVANS    This discharge recommendation:  Has been made in collaboration with the attending provider and/or case management    IF patient discharges home will need the following DME: to be determined (TBD)       SUBJECTIVE:   Patient stated I need to be suctioned.     OBJECTIVE DATA SUMMARY:   Critical Behavior:  Neurologic State: Alert  Orientation Level: Unable to verbalize  Cognition: Follows commands     Functional Mobility Training:  Bed Mobility:  Rolling: Stand-by assistance; Additional time  Supine to Sit: Stand-by assistance; Additional time  Sit to Supine: Contact guard assistance; Additional time  Scooting: Minimum assistance; Additional time     Balance:  Sitting: Intact; With support  Sitting - Static: Good (unsupported)  Sitting - Dynamic: Fair (occasional)     Therapeutic Exercises:       EXERCISE   Sets   Reps   Active Active Assist   Passive Self ROM   Comments   Ankle Pumps 1 20 [x] [] [] []    Glut Sets 1 10 [x] [] [] []    Hip abd/add 1 5 [x] [] [] []    Long Arc Quads 1 10 [x] [] [] []    Marching 1 10 [x] [] [] []       Pain Ratin/10    Activity Tolerance:   Fair and requires rest breaks    After treatment patient left in no apparent distress:   Bed locked and returned to lowest position, Supine in bed, Call bell within reach, Bed / chair alarm activated, and Side rails x 3    COMMUNICATION/COLLABORATION:   The patients plan of care was discussed with: Registered nurse.      Tashi Rosas PTA, PT   Time Calculation: 24 mins

## 2022-12-04 NOTE — PROGRESS NOTES
Patient received 150 ml TF and 50 ml water flush at this time. Patient requesting to stop due to feeling full.
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
## 2022-12-04 NOTE — CONSULTS
Consult  Pulmonary, Critical Care    Name: Ema Terrazas MRN: [id number]   : 1955 Hospital: [facility]   Date: 2022  Admission date: 2022 Hospital Day: 8       Subjective/Interval History:   Seen in the ICU currently on heated humidified air. Patient was transferred to the ICU because she normally uses a trilogy noninvasive ventilator at home and the family brought it in hospital policy requires use of that in the ICU. Information from her is quite limited as she has a tracheostomy and is nonverbal.  As best I can determine she has not used her trilogy for the last week since she has been in the hospital has noted worsening shortness of breath with that increased secretions probably some thickness to the secretions more than usual.  She was initially admitted  with burning on urination was found to have E. coli septicemia and urinary tract infection. Initial chest x-ray showed minimal atelectasis left base there is been no further x-rays taken. Initial procalcitonin was 0.34 with repeat  of less than 0.05. Has been receiving normal saline since admission at 75 cc an hour he is currently written to receive tube feedings bolus 5 times a day volume 237 mL with water flush 240 mL after each bolus there is been no fever the last 24 hours last CBC  showed WBC count 13.9    Patient Active Problem List   Diagnosis Code    Complicated UTI (urinary tract infection) N39.0    UTI (urinary tract infection) N39.0       IMPRESSION:   E. coli septicemia  E. coli urinary tract infection  Atelectasis on initial chest x-ray  Exacerbation COPD  Chronic respiratory failure with home noninvasive ventilator  Chronic tracheostomy  Chronic PEG tube  Underlying myasthenia gravis  Body mass index is 20.3 kg/m².         RECOMMENDATIONS/PLAN:   We will repeat chest x-ray  Will check sputum for culture  Will discontinue Mucomyst  Continue nebulized albuterol Atrovent budesonide  We will reinstitute use of her trilogy noninvasive ventilator at night and during the day as needed           Subjective/Initial History:   I have reviewed the flowsheet and previous  notes. I was asked by Mera Lozano MD to see Ina Conner a 78 y.o.  female  in consultation for a chief complaint of chronic respiratory failure with trilogy noninvasive ventilator use at night atelectasis and worsening shortness of breath    History from the patient is problematic as she has a tracheostomy in    Patient PCP: Lam Singletary MD  PMH:  has a past medical history of Atrial fibrillation ([address] Chronic obstructive pulmonary disease ([address] Hypertension, Myasthenia ([address] PEG (percutaneous endoscopic gastrostomy) status ([address] and Tracheostomy dependent ([address] PSH:   has a past surgical history that includes hx tracheostomy and hx other surgical.   FHX: family history includes Diabetes in her mother; Heart Disease in her father. SHX:  reports that she has quit smoking. Her smoking use included cigarettes. She has never used smokeless tobacco. She reports that she does not currently use alcohol. Systemic review is very limited as she has a tracheostomy in does have a talking computer.   General complains of worsening shortness of breath since she has been admitted with increased secretions  Eyes no double vision or momentary blindness  ENT indicates no sinus congestion or drainage  Endocrinologic does not know if she is had polyuria indicates no thirst  Musculoskeletal I cannot tell what she is trying to say to me  Neurologic no history seizures I cannot tell what she is indicating as far as any new weakness  Gastrointestinal chronic PEG tube with feedings she indicates no nausea or vomiting  Genitourinary reportedly had burning on urination on admission she indicates none currently  Cardiovascular as best I can tell there is been no chest pain diaphoresis or history of heart disease  Respiratory chronic trilogy noninvasive ventilator has not been used since admission we will resume it tonight she thinks she is having worsening shortness of breath.   At home family initially says she does not use it regularly but she indicates for the week before admission she was using it every night    Allergies   Allergen Reactions    Soliris [Eculizumab] Unknown (comments)      MEDS:   Current Facility-Administered Medications   Medication    albuterol-ipratropium (DUO-NEB) 2.5 MG-0.5 MG/3 ML    sodium chloride 0.9% (NS) 3ml nebulizer solution    piperacillin-tazobactam (ZOSYN) 3.375 g in 0.9% sodium chloride (MBP/ADV) 100 mL MBP    budesonide (PULMICORT) 500 mcg/2 ml nebulizer suspension    0.9% sodium chloride infusion    sodium chloride (NS) flush 5-40 mL    acetaminophen (TYLENOL) tablet 650 mg    Or    acetaminophen (TYLENOL) suppository 650 mg    polyethylene glycol (MIRALAX) packet 17 g    promethazine (PHENERGAN) tablet 12.5 mg    Or    ondansetron (ZOFRAN) injection 4 mg    apixaban (ELIQUIS) tablet 5 mg    brimonidine (ALPHAGAN) 0.2 % ophthalmic solution 1 Drop    [Held by provider] cholecalciferol (vitamin D3) 10 mcg/mL (400 unit/mL) oral liquid 20 mcg    dilTIAZem IR (CARDIZEM) tablet 60 mg    latanoprost (XALATAN) 0.005 % ophthalmic solution 1 Drop    losartan (COZAAR) tablet 25 mg    mycophenolate mofetil (CELLCEPT) 200 mg/mL oral suspension 1,000 mg    pyridostigmine (MESTINON) tablet 60 mg    predniSONE (DELTASONE) tablet 20 mg    dorzolamide (TRUSOPT) 2 % ophthalmic solution 1 Drop        Current Facility-Administered Medications:     albuterol-ipratropium (DUO-NEB) 2.5 MG-0.5 MG/3 ML, 3 mL, Nebulization, Q6H RT, LidiaColin MD, 3 mL at 12/04/22 1319    sodium chloride 0.9% (NS) 3ml nebulizer solution, 2.5 mL, Nebulization, PRN, Ivett, Mao MALDONADO MD, 2.5 mL at 12/02/22 1503    [COMPLETED] piperacillin-tazobactam (ZOSYN) 4.5 g in 0.9% sodium chloride (MBP/ADV) 100 mL MBP, 4.5 g, IntraVENous, ONCE, Last Rate: 200 mL/hr at 11/29/22 1025, 4.5 g at 11/29/22 1025 **FOLLOWED BY** piperacillin-tazobactam (ZOSYN) 3.375 g in 0.9% sodium chloride (MBP/ADV) 100 mL MBP, 3.375 g, IntraVENous, Q8H, Rios Conti MD, Last Rate: 25 mL/hr at 12/04/22 0934, 3.375 g at 12/04/22 0934    budesonide (PULMICORT) 500 mcg/2 ml nebulizer suspension, 500 mcg, Nebulization, BID RT, Beverly Epley, MD, 500 mcg at 12/04/22 0726    0.9% sodium chloride infusion, 75 mL/hr, IntraVENous, CONTINUOUS, Rios Zepeda MD, Last Rate: 75 mL/hr at 12/04/22 0940, 75 mL/hr at 12/04/22 0940    sodium chloride (NS) flush 5-40 mL, 5-40 mL, IntraVENous, PRN, Beverly Epley, MD, 10 mL at 12/03/22 2202    acetaminophen (TYLENOL) tablet 650 mg, 650 mg, Oral, Q6H PRN, 650 mg at 12/02/22 0819 **OR** acetaminophen (TYLENOL) suppository 650 mg, 650 mg, Rectal, Q6H PRN, Rios Zepeda MD    polyethylene glycol (MIRALAX) packet 17 g, 17 g, Oral, DAILY PRN, Rios Zepeda MD    promethazine (PHENERGAN) tablet 12.5 mg, 12.5 mg, Oral, Q6H PRN **OR** ondansetron (ZOFRAN) injection 4 mg, 4 mg, IntraVENous, Q6H PRN, Rios Zepeda MD    apixaban Arzella Profit) tablet 5 mg, 5 mg, Oral, BID, Beverly Epley, MD, 5 mg at 12/04/22 0934    brimonidine (ALPHAGAN) 0.2 % ophthalmic solution 1 Drop, 1 Drop, Ophthalmic, TID, Beverly Epley, MD, 1 Drop at 12/04/22 0933    [Held by provider] cholecalciferol (vitamin D3) 10 mcg/mL (400 unit/mL) oral liquid 20 mcg, 20 mcg, Oral, DAILY, Beverly Epley, MD, 20 mcg at 12/01/22 0942    dilTIAZem IR (CARDIZEM) tablet 60 mg, 60 mg, Oral, BID, Beverly Epley, MD, 60 mg at 12/04/22 0934    latanoprost (XALATAN) 0.005 % ophthalmic solution 1 Drop, 1 Drop, Both Eyes, QPM, Rios Conti MD, 1 Drop at 12/03/22 1751    losartan (COZAAR) tablet 25 mg, 25 mg, Oral, DAILY, Rios Zepeda MD, 25 mg at 12/04/22 0007    mycophenolate mofetil (CELLCEPT) 200 mg/mL oral suspension 1,000 mg, 1,000 mg, Oral, BID, Gallito Jose L Goodman MD, 1,000 mg at 22 9951    pyridostigmine (MESTINON) tablet 60 mg, 60 mg, Oral, DAILY, Nadiya Monique MD, 60 mg at 22 0934    predniSONE (DELTASONE) tablet 20 mg, 20 mg, Per G Tube, BID WITH MEALS, [facility], Jose L Goodman MD, 20 mg at 22 0934    dorzolamide (TRUSOPT) 2 % ophthalmic solution 1 Drop, 1 Drop, Both Eyes, TID, Jose L Conti MD, 1 Drop at 22 0933      Objective:     Vital Signs: Telemetry:    normal sinus rhythm Intake/Output:   Visit Vitals  /86   Pulse 89   Temp 97.9 °F (36.6 °C)   Resp 23   Ht 5' 2.01\" (1.575 m)   Wt 50.3 kg (111 lb)   SpO2 100%   Breastfeeding No   BMI 20.30 kg/m²       Temp (24hrs), Av.7 °F (36.5 °C), Min:97.5 °F (36.4 °C), Max:97.9 °F (36.6 °C)        O2 Device: Heated, Tracheal collar O2 Flow Rate (L/min): 10 l/min         Body mass index is 20.3 kg/m². Wt Readings from Last 4 Encounters:   22 50.3 kg (111 lb)   22 52.2 kg (115 lb)   21 61.2 kg (135 lb)          Intake/Output Summary (Last 24 hours) at 2022 1508  Last data filed at 2022 1500  Gross per 24 hour   Intake 3121.25 ml   Output 1400 ml   Net 1721.25 ml       Last shift:       07 -  1900  In: 1423.8 [I.V.:1223.8]  Out: 500 [Urine:500]  Last 3 shifts: 1901 -  0700  In: 2837.5 [I.V.:1997.5]  Out: 0545 [Urine:1600]       Hemodynamics:    CO:    CI:    CVP:    SVR:   PAP Systolic:    PAP Diastolic:    PVR:    VZ25:       Ventilator Settings:      Mode Rate TV Press PEEP FiO2 PIP Min. Vent               21 %     5.2 l/min      Physical Exam:    General:  female; sitting up in the bed no respiratory distress no accessory muscle use nonverbal  HEENT: NCAT, normal oral mucosa  Eyes: anicteric; conjunctiva clear extraocular movements intact  Neck: no nodes, no JVD no accessory MM use.   Chest: no deformity,   Cardiac: Regular rate and rhythm  Lungs: Very poor breath sounds with prolongation of exhalation a few faint wheezes are heard no definite rales   Abd: Soft positive bowel sounds no tenderness PEG tube in place  Ext: no edema; no joint swelling; No clubbing  : clear urine  Neuro: Alert nonverbal moves all 4 extremities weakly  Psych- no agitation, oriented to person;   Skin: warm, dry, no cyanosis;   Pulses: Radial pulses intact  Capillary: Normal capillary refill      Labs:    11/27 WBC count 14.1  12/1 WBC count 13.9 hemoglobin 12.7  11/27 blood culture E. Coli  12/2 blood culture no growth  11/27 urine E. Coli  Procalcitonin less than 0.05, 0.34  Admission BNP 77  Recent Labs     12/04/22  0622 12/03/22  0600 12/02/22  0551    144  --    K 4.0 4.4  --    * 111*  --    CO2 28 28  --    GLU 94 98  --    BUN 18 14  --    CREA 0.63 0.60  --    CA 11.3* 10.9*  --    MG 2.5* 2.6* 2.7*   PHOS 3.2 3.0  --    ALB 3.6 3.2*  --        Lab Results   Component Value Date/Time    Culture result: No growth 1 day 12/02/2022 05:51 AM    Culture result: (A) 11/27/2022 03:30 PM     Escherichia coli growing in 2 of 4 bottles drawn No Site Indicated    Culture result: remaining bottle(s) has/have no growth so far 11/27/2022 03:30 PM    Culture result:  11/27/2022 03:30 PM     (NOTE) GNR CALLED TO AND READ BACK BY TIFFANY MCCLELLAN AT 1905 PER DLU ON [id number]      No results found for: VANCT, CPK    Imaging:  I have personally reviewed the patients radiographs and have reviewed the reports:    CXR Results  (Last 48 hours)      None          Results from [place] encounter on 11/27/22    XR CHEST PORT    Narrative  INDICATION:  sob    Exam: Portable chest 1331. Comparison: 9/29/2022. Findings: Tracheostomy unchanged. Cardiomediastinal silhouette is within normal  limits. Pulmonary vasculature is not engorged. Left basilar atelectasis. No new  consolidation effusion or pneumothorax. Impression  1.  Left basilar atelectasis      Results from Hospital Encounter encounter on 09/29/22    XR CHEST PORT    Narrative  EXAM: XR CHEST PORT    INDICATION: Shortness of breath    COMPARISON: 12/18/2017. TECHNIQUE: Portable AP upright chest view at 0216 hours    FINDINGS: A tracheostomy tube terminates above the carine. The cardiomediastinal  contours are within normal limits. The lungs and pleural spaces are clear. There  is no pneumothorax. The bones and upper abdomen are stable. Impression  No acute process. Results from [place] encounter on 09/29/22    CT ABD PELV W CONT    Narrative  EXAM:  CT ABD PELV W CONT    INDICATION: Left upper quadrant pain    COMPARISON: CT 9/11/2015. TECHNIQUE: Helical CT of the abdomen  and pelvis  following the uneventful  intravenous administration of nonionic contrast.  Coronal and sagittal reformats  are performed. CT dose reduction was achieved through use of a standardized  protocol tailored for this examination and automatic exposure control for dose  modulation. FINDINGS:  The visualized lung bases demonstrate no mass or consolidation. The heart size  is normal. There is no pericardial or pleural effusion. The liver, spleen, pancreas, and adrenal glands are normal. A gallstone is noted  without intra- or extra-hepatic biliary dilatation. The kidneys are symmetric without hydronephrosis. Bilateral kidney cysts measure  1 cm for which no imaging follow-up is recommended. A percutaneous gastrostomy tube is in place. There are no dilated bowel loops. The appendix is normal. There is diffuse colonic diverticulosis without focal  adjacent inflammation. There are no enlarged lymph nodes. There is no free fluid or free air. There is  minimal dilatation of the infrarenal aorta measuring 2.4 cm in diameter. The urinary bladder is normal.  There is no pelvic mass. The uterus is  surgically absent. The bony structures are age-appropriate. Impression  No acute abnormality in the abdomen or pelvis. Cholelithiasis.     Discussion patient with chronic tracheostomy from myasthenia gravis on noninvasive ventilator normally at night at home has not used since she was admitted. Family brought it in yesterday. We will ask respiratory to set it up to be used tonight. She indicates increasing shortness of breath not sure if that is just from lack of use of the trilogy or if she has an acute pulmonary process. She had minimal atelectasis on admission most recent procalcitonin is normal.  We will repeat chest x-ray we will set up trilogy for use at night. Will do surveillance culture of her sputum. Mucomyst was started yesterday she has some mild wheezing needs a bronchoconstrictive will discontinue it. If chest x-ray shows collapsed areas of lung we will resume the Mucomyst  Time of care 50 minutes           Thank you for allowing us to participate in the care of this patient.   We will follow along with you   Odalys Stock MD

## 2022-12-04 NOTE — PROGRESS NOTES
Per Dr Dana Hopkins, note of Trilogy settings:    PSV AVAPS 350 Vt / PEEP 5 / PS Min 4/Max 15 / AVAPS speed 3 / Backup rate 10 / Rise Time 1 sec / 21%

## 2022-12-04 NOTE — ROUTINE PROCESS
Pt family member brought in pt home triology last night, per pt when she wears at home the cuff is then inflated, turning machine to act as a vent while sleeping. Has set rate. Per policy pt will need to be monitored in the ICU with use of this machine.

## 2022-12-05 VITALS
WEIGHT: 111 LBS | RESPIRATION RATE: 19 BRPM | SYSTOLIC BLOOD PRESSURE: 118 MMHG | HEIGHT: 62 IN | OXYGEN SATURATION: 98 % | TEMPERATURE: 98.9 F | BODY MASS INDEX: 20.43 KG/M2 | DIASTOLIC BLOOD PRESSURE: 78 MMHG | HEART RATE: 71 BPM

## 2022-12-05 PROBLEM — I10 HYPERTENSION: Status: ACTIVE | Noted: 2022-12-05

## 2022-12-05 PROBLEM — J44.9 CHRONIC OBSTRUCTIVE PULMONARY DISEASE (HCC): Status: ACTIVE | Noted: 2022-12-05

## 2022-12-05 PROBLEM — I48.91 ATRIAL FIBRILLATION (HCC): Status: ACTIVE | Noted: 2022-12-05

## 2022-12-05 LAB
ALBUMIN SERPL-MCNC: 3.1 G/DL (ref 3.5–5)
ANION GAP SERPL CALC-SCNC: 3 MMOL/L (ref 5–15)
BASOPHILS # BLD: 0 K/UL (ref 0–0.1)
BASOPHILS NFR BLD: 0 % (ref 0–1)
BNP SERPL-MCNC: 95 PG/ML
BUN SERPL-MCNC: 19 MG/DL (ref 6–20)
BUN/CREAT SERPL: 32 (ref 12–20)
CA-I BLD-MCNC: 10.6 MG/DL (ref 8.5–10.1)
CHLORIDE SERPL-SCNC: 113 MMOL/L (ref 97–108)
CO2 SERPL-SCNC: 27 MMOL/L (ref 21–32)
CREAT SERPL-MCNC: 0.6 MG/DL (ref 0.55–1.02)
CRP SERPL-MCNC: <0.29 MG/DL (ref 0–0.6)
DIFFERENTIAL METHOD BLD: ABNORMAL
EOSINOPHIL # BLD: 0 K/UL (ref 0–0.4)
EOSINOPHIL NFR BLD: 0 % (ref 0–7)
ERYTHROCYTE [DISTWIDTH] IN BLOOD BY AUTOMATED COUNT: 14.5 % (ref 11.5–14.5)
GLUCOSE SERPL-MCNC: 117 MG/DL (ref 65–100)
HCT VFR BLD AUTO: 34.4 % (ref 35–47)
HGB BLD-MCNC: 10.9 G/DL (ref 11.5–16)
IMM GRANULOCYTES # BLD AUTO: 0.1 K/UL (ref 0–0.04)
IMM GRANULOCYTES NFR BLD AUTO: 1 % (ref 0–0.5)
LYMPHOCYTES # BLD: 0.7 K/UL (ref 0.8–3.5)
LYMPHOCYTES NFR BLD: 6 % (ref 12–49)
MAGNESIUM SERPL-MCNC: 2.3 MG/DL (ref 1.6–2.4)
MCH RBC QN AUTO: 30.2 PG (ref 26–34)
MCHC RBC AUTO-ENTMCNC: 31.7 G/DL (ref 30–36.5)
MCV RBC AUTO: 95.3 FL (ref 80–99)
MONOCYTES # BLD: 0.4 K/UL (ref 0–1)
MONOCYTES NFR BLD: 3 % (ref 5–13)
NEUTS SEG # BLD: 10.7 K/UL (ref 1.8–8)
NEUTS SEG NFR BLD: 90 % (ref 32–75)
NRBC # BLD: 0 K/UL (ref 0–0.01)
NRBC BLD-RTO: 0 PER 100 WBC
PHOSPHATE SERPL-MCNC: 2.8 MG/DL (ref 2.6–4.7)
PLATELET # BLD AUTO: 308 K/UL (ref 150–400)
PMV BLD AUTO: 10.1 FL (ref 8.9–12.9)
POTASSIUM SERPL-SCNC: 4.1 MMOL/L (ref 3.5–5.1)
PROCALCITONIN SERPL-MCNC: <0.05 NG/ML
RBC # BLD AUTO: 3.61 M/UL (ref 3.8–5.2)
SODIUM SERPL-SCNC: 143 MMOL/L (ref 136–145)
WBC # BLD AUTO: 11.9 K/UL (ref 3.6–11)

## 2022-12-05 PROCEDURE — 94640 AIRWAY INHALATION TREATMENT: CPT

## 2022-12-05 PROCEDURE — 94669 MECHANICAL CHEST WALL OSCILL: CPT

## 2022-12-05 PROCEDURE — 86140 C-REACTIVE PROTEIN: CPT

## 2022-12-05 PROCEDURE — 85025 COMPLETE CBC W/AUTO DIFF WBC: CPT

## 2022-12-05 PROCEDURE — 83880 ASSAY OF NATRIURETIC PEPTIDE: CPT

## 2022-12-05 PROCEDURE — 36415 COLL VENOUS BLD VENIPUNCTURE: CPT

## 2022-12-05 PROCEDURE — 74011000258 HC RX REV CODE- 258: Performed by: INTERNAL MEDICINE

## 2022-12-05 PROCEDURE — 74011000250 HC RX REV CODE- 250: Performed by: INTERNAL MEDICINE

## 2022-12-05 PROCEDURE — 84145 PROCALCITONIN (PCT): CPT

## 2022-12-05 PROCEDURE — 77010033678 HC OXYGEN DAILY

## 2022-12-05 PROCEDURE — 74011636637 HC RX REV CODE- 636/637: Performed by: INTERNAL MEDICINE

## 2022-12-05 PROCEDURE — 80069 RENAL FUNCTION PANEL: CPT

## 2022-12-05 PROCEDURE — 83735 ASSAY OF MAGNESIUM: CPT

## 2022-12-05 PROCEDURE — 74011250636 HC RX REV CODE- 250/636: Performed by: INTERNAL MEDICINE

## 2022-12-05 PROCEDURE — 74011250637 HC RX REV CODE- 250/637: Performed by: INTERNAL MEDICINE

## 2022-12-05 RX ORDER — LORATADINE 10 MG/1
10 TABLET ORAL DAILY
COMMUNITY

## 2022-12-05 RX ORDER — LEVOFLOXACIN 25 MG/ML
500 SOLUTION ORAL EVERY 24 HOURS
Status: DISCONTINUED | OUTPATIENT
Start: 2022-12-05 | End: 2022-12-05 | Stop reason: HOSPADM

## 2022-12-05 RX ORDER — PREDNISONE 20 MG/1
20 TABLET ORAL DAILY
Qty: 5 TABLET | Refills: 0 | Status: SHIPPED | OUTPATIENT
Start: 2022-12-05

## 2022-12-05 RX ORDER — CHLORHEXIDINE GLUCONATE 1.2 MG/ML
15 RINSE ORAL AS NEEDED
COMMUNITY

## 2022-12-05 RX ORDER — ATROPINE SULFATE 0.01 %
DROPS, EMULSION OPHTHALMIC (EYE) DAILY
COMMUNITY

## 2022-12-05 RX ORDER — ALBUTEROL SULFATE 90 UG/1
2 AEROSOL, METERED RESPIRATORY (INHALATION)
COMMUNITY

## 2022-12-05 RX ORDER — AMLODIPINE BESYLATE 10 MG/1
10 TABLET ORAL DAILY
COMMUNITY
Start: 2022-04-08

## 2022-12-05 RX ORDER — FLUTICASONE FUROATE AND VILANTEROL 100; 25 UG/1; UG/1
1 POWDER RESPIRATORY (INHALATION) DAILY
COMMUNITY

## 2022-12-05 RX ORDER — LEVOFLOXACIN 25 MG/ML
500 SOLUTION ORAL EVERY 24 HOURS
Qty: 70 ML | Refills: 0 | Status: SHIPPED | OUTPATIENT
Start: 2022-12-05

## 2022-12-05 RX ORDER — NETARSUDIL 0.2 MG/ML
SOLUTION/ DROPS OPHTHALMIC; TOPICAL DAILY
COMMUNITY

## 2022-12-05 RX ADMIN — BRIMONIDINE TARTRATE 1 DROP: 2 SOLUTION OPHTHALMIC at 08:13

## 2022-12-05 RX ADMIN — LOSARTAN POTASSIUM 25 MG: 25 TABLET, FILM COATED ORAL at 08:12

## 2022-12-05 RX ADMIN — BUDESONIDE 500 MCG: 0.5 INHALANT ORAL at 08:42

## 2022-12-05 RX ADMIN — PREDNISONE 20 MG: 20 TABLET ORAL at 08:12

## 2022-12-05 RX ADMIN — PYRIDOSTIGMINE BROMIDE 60 MG: 60 TABLET ORAL at 08:12

## 2022-12-05 RX ADMIN — DORZOLAMIDE HYDROCHLORIDE 1 DROP: 20 SOLUTION/ DROPS OPHTHALMIC at 08:13

## 2022-12-05 RX ADMIN — IPRATROPIUM BROMIDE AND ALBUTEROL SULFATE 3 ML: .5; 3 SOLUTION RESPIRATORY (INHALATION) at 14:00

## 2022-12-05 RX ADMIN — DILTIAZEM HYDROCHLORIDE 60 MG: 30 TABLET, FILM COATED ORAL at 08:13

## 2022-12-05 RX ADMIN — IPRATROPIUM BROMIDE AND ALBUTEROL SULFATE 3 ML: .5; 3 SOLUTION RESPIRATORY (INHALATION) at 08:42

## 2022-12-05 RX ADMIN — APIXABAN 5 MG: 5 TABLET, FILM COATED ORAL at 08:12

## 2022-12-05 RX ADMIN — PIPERACILLIN AND TAZOBACTAM 3.38 G: 3; .375 INJECTION, POWDER, FOR SOLUTION INTRAVENOUS at 00:25

## 2022-12-05 RX ADMIN — MYCOPHENOLATE MOFETIL 1000 MG: 200 POWDER, FOR SUSPENSION ORAL at 09:11

## 2022-12-05 RX ADMIN — IPRATROPIUM BROMIDE AND ALBUTEROL SULFATE 3 ML: .5; 3 SOLUTION RESPIRATORY (INHALATION) at 01:18

## 2022-12-05 RX ADMIN — PIPERACILLIN AND TAZOBACTAM 3.38 G: 3; .375 INJECTION, POWDER, FOR SOLUTION INTRAVENOUS at 08:12

## 2022-12-05 NOTE — PROGRESS NOTES
Attempted to reach daughter x3 regarding patient discharge to home and follow up appointments. Busy signal each time with no option to leave a message or voicemail.

## 2022-12-05 NOTE — PROGRESS NOTES
0800- Respiratory therapist to unit, Sohail Chowdary. RT reports patient will need to transfer to ICU setting with Trilogy being used at night time. Morning medications and Jevity 237 ml and 240 H2O completed prior to transfer. Patient escorted to ICU with assistance of shelbi Luo at bedside with no questions at time of transfer.

## 2022-12-05 NOTE — PROGRESS NOTES
14: 00PM Patient discharging home self care w/follow up appointment's. Declined for [facility] and discharging attending made aware. Discharge summary reflects declined by [id number] agencies. Medicare pt has received, reviewed, and signed 2nd IM letter informing them of their right to appeal the discharge. Signed copy has been placed on pt bedside chart. Anil Connors MSHIRAL        13:28PM  Outbound call to Riri Jha [facility]PS) @ [phone]. No answer and voice message left requesting a return call. Anil Connors MSHIRAL          13:25PM [address] referrals sent out. Patient has been declined by all 13 agencies d/t: out of service area; out of network w/insurance; and patient too complex ([facility]). Perfect serve message sent to attending informing him of no accepting [facility] agency. JOCELYNN Moore        10:36AM Transferred to ICU from . Patient has been accepted to two different SNF facilities'. Patient's insurance will require authorization. Therapy recommendation is SNF vs HH at time of discharge. No HH referrals have been sent out on patient's behalf. If patient desires HH will need authorization from insurance. Patient has personal care aide service via [address] [facility] [address] [place] [address] location) @ [phone]. Mon-Friday 0800-16;00PM.  Personal care service [facility] 9/22/22. Current open APS case [facility])  point of contact Riri Jha @ [phone]. Will need to speak w/APS personnel re: discharge plan. Will meet w/patient to discuss discharge disposition.         JOCELYNN Moore

## 2022-12-05 NOTE — PROGRESS NOTES
Consult  Pulmonary, Critical Care    Name: Ashia Chapa MRN: [id number]   : 1955 Hospital: [facility]   Date: 2022  Admission date: 2022 Hospital Day: 9       Subjective/Interval History:   Seen in the ICU currently on heated humidified air. Patient was transferred to the ICU because she normally uses a trilogy noninvasive ventilator at home and the family brought it in hospital policy requires use of that in the ICU. Information from her is quite limited as she has a tracheostomy and is nonverbal.  As best I can determine she has not used her trilogy for the last week since she has been in the hospital has noted worsening shortness of breath with that increased secretions probably some thickness to the secretions more than usual.  She was initially admitted  with burning on urination was found to have E. coli septicemia and urinary tract infection. Initial chest x-ray showed minimal atelectasis left base there is been no further x-rays taken. Initial procalcitonin was 0.34 with repeat  of less than 0.05. Has been receiving normal saline since admission at 75 cc an hour he is currently written to receive tube feedings bolus 5 times a day volume 237 mL with water flush 240 mL after each bolus there is been no fever the last 24 hours last CBC  showed WBC count 13.9   awake alert looks comfortable no distress no accessory muscle use FiO2 is 21%    Patient Active Problem List   Diagnosis Code    Complicated UTI (urinary tract infection) N39.0    UTI (urinary tract infection) N39.0       IMPRESSION:   E. coli septicemia  E. coli urinary tract infection  Atelectasis on initial chest x-ray with minimal persisting on x-ray   Exacerbation COPD no wheezing  Chronic respiratory failure with home noninvasive ventilator  Chronic tracheostomy  Chronic PEG tube  Underlying myasthenia gravis  Body mass index is 20.3 kg/m².         RECOMMENDATIONS/PLAN:   Vu chest x-ray shows minimal atelectasis in the bases  Sputum culture ordered yesterday no results at this point  Continue nebulized albuterol Atrovent budesonide  We will reinstitute use of her trilogy noninvasive ventilator at night and during the day as needed her machine settings are AVAPS rate 10  EP 5 minimum IP 4 maximum IPAP 15 FiO2 21%           Subjective/Initial History:   I have reviewed the flowsheet and previous  notes. I was asked by Wisam Garcia MD to see Fco Sullivan a 78 y.o.  female  in consultation for a chief complaint of chronic respiratory failure with trilogy noninvasive ventilator use at night atelectasis and worsening shortness of breath    History from the patient is problematic as she has a tracheostomy in    Patient PCP: Arianna Cho MD  PMH:  has a past medical history of Atrial fibrillation ([address] Chronic obstructive pulmonary disease ([address] Hypertension, Myasthenia ([address] PEG (percutaneous endoscopic gastrostomy) status ([address] and Tracheostomy dependent ([address] PSH:   has a past surgical history that includes hx tracheostomy and hx other surgical.   FHX: family history includes Diabetes in her mother; Heart Disease in her father. SHX:  reports that she has quit smoking. Her smoking use included cigarettes. She has never used smokeless tobacco. She reports that she does not currently use alcohol. Systemic review is very limited as she has a tracheostomy in does have a talking computer.   General complains of worsening shortness of breath since she has been admitted with increased secretions  Eyes no double vision or momentary blindness  ENT indicates no sinus congestion or drainage  Endocrinologic does not know if she is had polyuria indicates no thirst  Musculoskeletal I cannot tell what she is trying to say to me  Neurologic no history seizures I cannot tell what she is indicating as far as any new weakness  Gastrointestinal chronic PEG tube with feedings she indicates no nausea or vomiting  Genitourinary reportedly had burning on urination on admission she indicates none currently  Cardiovascular as best I can tell there is been no chest pain diaphoresis or history of heart disease  Respiratory chronic trilogy noninvasive ventilator has not been used since admission we will resume it tonight she thinks she is having worsening shortness of breath.   At home family initially says she does not use it regularly but she indicates for the week before admission she was using it every night    Allergies   Allergen Reactions    Soliris [Eculizumab] Unknown (comments)      MEDS:   Current Facility-Administered Medications   Medication    albuterol-ipratropium (DUO-NEB) 2.5 MG-0.5 MG/3 ML    sodium chloride 0.9% (NS) 3ml nebulizer solution    piperacillin-tazobactam (ZOSYN) 3.375 g in 0.9% sodium chloride (MBP/ADV) 100 mL MBP    budesonide (PULMICORT) 500 mcg/2 ml nebulizer suspension    0.9% sodium chloride infusion    sodium chloride (NS) flush 5-40 mL    acetaminophen (TYLENOL) tablet 650 mg    Or    acetaminophen (TYLENOL) suppository 650 mg    polyethylene glycol (MIRALAX) packet 17 g    promethazine (PHENERGAN) tablet 12.5 mg    Or    ondansetron (ZOFRAN) injection 4 mg    apixaban (ELIQUIS) tablet 5 mg    brimonidine (ALPHAGAN) 0.2 % ophthalmic solution 1 Drop    [Held by provider] cholecalciferol (vitamin D3) 10 mcg/mL (400 unit/mL) oral liquid 20 mcg    dilTIAZem IR (CARDIZEM) tablet 60 mg    latanoprost (XALATAN) 0.005 % ophthalmic solution 1 Drop    losartan (COZAAR) tablet 25 mg    mycophenolate mofetil (CELLCEPT) 200 mg/mL oral suspension 1,000 mg    pyridostigmine (MESTINON) tablet 60 mg    predniSONE (DELTASONE) tablet 20 mg    dorzolamide (TRUSOPT) 2 % ophthalmic solution 1 Drop        Current Facility-Administered Medications:     albuterol-ipratropium (DUO-NEB) 2.5 MG-0.5 MG/3 ML, 3 mL, Nebulization, Q6H RT, Lidia, Oscar Posada MD, 3 mL at 12/05/22 0842    sodium chloride 0.9% (NS) 3ml nebulizer solution, 2.5 mL, Nebulization, PRN, Ivett, Mao MALDONADO MD, 2.5 mL at 12/02/22 1503    [COMPLETED] piperacillin-tazobactam (ZOSYN) 4.5 g in 0.9% sodium chloride (MBP/ADV) 100 mL MBP, 4.5 g, IntraVENous, ONCE, Last Rate: 200 mL/hr at 11/29/22 1025, 4.5 g at 11/29/22 1025 **FOLLOWED BY** piperacillin-tazobactam (ZOSYN) 3.375 g in 0.9% sodium chloride (MBP/ADV) 100 mL MBP, 3.375 g, IntraVENous, Q8H, Lelia Conti MD, Last Rate: 25 mL/hr at 12/05/22 0812, 3.375 g at 12/05/22 0812    budesonide (PULMICORT) 500 mcg/2 ml nebulizer suspension, 500 mcg, Nebulization, BID RT, Kat Valentine MD, 500 mcg at 12/05/22 0842    0.9% sodium chloride infusion, 75 mL/hr, IntraVENous, CONTINUOUS, Lelia Garcia MD, Last Rate: 75 mL/hr at 12/04/22 0940, 75 mL/hr at 12/04/22 0940    sodium chloride (NS) flush 5-40 mL, 5-40 mL, IntraVENous, PRN, Kat Valentine MD, 10 mL at 12/03/22 2202    acetaminophen (TYLENOL) tablet 650 mg, 650 mg, Oral, Q6H PRN, 650 mg at 12/02/22 0819 **OR** acetaminophen (TYLENOL) suppository 650 mg, 650 mg, Rectal, Q6H PRN, Lelia Garcia MD    polyethylene glycol (MIRALAX) packet 17 g, 17 g, Oral, DAILY PRN, Lelia Garcia MD    promethazine (PHENERGAN) tablet 12.5 mg, 12.5 mg, Oral, Q6H PRN **OR** ondansetron (ZOFRAN) injection 4 mg, 4 mg, IntraVENous, Q6H PRN, Kat Valentine MD, 4 mg at 12/04/22 1545    apixaban (ELIQUIS) tablet 5 mg, 5 mg, Oral, BID, Kat Valentine MD, 5 mg at 12/05/22 9255    brimonidine (ALPHAGAN) 0.2 % ophthalmic solution 1 Drop, 1 Drop, Ophthalmic, TID, Kat Valentine MD, 1 Drop at 12/05/22 0813    [Held by provider] cholecalciferol (vitamin D3) 10 mcg/mL (400 unit/mL) oral liquid 20 mcg, 20 mcg, Oral, DAILY, Lelia Garcia MD, 20 mcg at 12/01/22 0942    dilTIAZem IR (CARDIZEM) tablet 60 mg, 60 mg, Oral, BID, Kat Valentine MD, 60 mg at 12/05/22 0813    latanoprost (XALATAN) 0.005 % ophthalmic solution 1 Drop, 1 Drop, Both Eyes, QPM, Tray Samaniego MD, 1 Drop at 22 1715    losartan (COZAAR) tablet 25 mg, 25 mg, Oral, DAILY, Tray Samaniego MD, 25 mg at 22 8540    mycophenolate mofetil (CELLCEPT) 200 mg/mL oral suspension 1,000 mg, 1,000 mg, Oral, BID, Tray Samaniego MD, 1,000 mg at 22 4219    pyridostigmine (MESTINON) tablet 60 mg, 60 mg, Oral, DAILY, Tray Samaniego MD, 60 mg at 22 7857    predniSONE (DELTASONE) tablet 20 mg, 20 mg, Per G Tube, BID WITH MEALS, Tray Samaniego MD, 20 mg at 22 7511    dorzolamide (TRUSOPT) 2 % ophthalmic solution 1 Drop, 1 Drop, Both Eyes, TID, Tray Samaniego MD, 1 Drop at 22 0813      Objective:     Vital Signs: Telemetry:    normal sinus rhythm Intake/Output:   Visit Vitals  /67   Pulse (!) 59   Temp 99 °F (37.2 °C)   Resp 20   Ht 5' 2.01\" (1.575 m)   Wt 50.3 kg (111 lb)   SpO2 100%   Breastfeeding No   BMI 20.30 kg/m²       Temp (24hrs), Av.2 °F (36.8 °C), Min:97.8 °F (36.6 °C), Max:99 °F (37.2 °C)        O2 Device: Heated, Tracheal collar O2 Flow Rate (L/min): 10 l/min         Body mass index is 20.3 kg/m². Wt Readings from Last 4 Encounters:   22 50.3 kg (111 lb)   22 52.2 kg (115 lb)   21 61.2 kg (135 lb)          Intake/Output Summary (Last 24 hours) at 2022 0950  Last data filed at 2022 0900  Gross per 24 hour   Intake 1917 ml   Output --   Net 1917 ml         Last shift:       0701 -  190  In: 961 [I.V.:250]  Out: -   Last 3 shifts: 1901 -  0700  In: 2658.8 [I.V.:1548.8]  Out: 1400 [Urine:1400]       Hemodynamics:    CO:    CI:    CVP:    SVR:   PAP Systolic:    PAP Diastolic:    PVR:    WU42:       Ventilator Settings:      Mode Rate TV Press PEEP FiO2 PIP Min.  Vent               21 %     5.2 l/min      Physical Exam:    General:  female; sitting up in the bed no respiratory distress no accessory muscle use nonverbal  HEENT: NCAT, normal oral mucosa  Eyes: anicteric; conjunctiva clear extraocular movements intact  Neck: no nodes, no JVD no accessory MM use tracheostomy in place. Chest: no deformity,   Cardiac: Regular rate and rhythm  Lungs: Poor breath sounds with prolongation of exhalation no wheezes or rales  Abd: Soft positive bowel sounds no tenderness PEG tube in place  Ext: no edema; no joint swelling; No clubbing  : clear urine  Neuro: Alert nonverbal moves all 4 extremities weakly  Psych- no agitation, oriented to person;   Skin: warm, dry, no cyanosis;   Pulses: Radial pulses intact  Capillary: Normal capillary refill      Labs:    11/27 WBC count 14.1  12/1 WBC count 13.9 hemoglobin 12.7  11/27 blood culture E. Coli  12/2 blood culture no growth  11/27 urine E. Coli  Procalcitonin less than 0.05, less than 0.05, 0.34  CRP less than 0.29  Admission BNP 77  Repeat BNP 95  Recent Labs     12/05/22  0520 12/04/22  0622 12/03/22  0600    145 144   K 4.1 4.0 4.4   * 111* 111*   CO2 27 28 28   * 94 98   BUN 19 18 14   CREA 0.60 0.63 0.60   CA 10.6* 11.3* 10.9*   MG 2.3 2.5* 2.6*   PHOS 2.8 3.2 3.0   ALB 3.1* 3.6 3.2*         Lab Results   Component Value Date/Time    Culture result: No growth 1 day 12/02/2022 05:51 AM    Culture result: (A) 11/27/2022 03:30 PM     Escherichia coli growing in 2 of 4 bottles drawn No Site Indicated    Culture result: remaining bottle(s) has/have no growth so far 11/27/2022 03:30 PM    Culture result:  11/27/2022 03:30 PM     (NOTE) GNR CALLED TO AND READ BACK BY TIFFANY MCCLELLAN AT 1905 PER DLU ON [id number]        No results found for: VANCT, CPK    Imaging:  I have personally reviewed the patients radiographs and have reviewed the reports:    CXR Results  (Last 48 hours)                 12/04/22 1955  XR CHEST PORT Final result    Impression:      Bibasilar atelectasis.            Narrative:  EXAM:  XR CHEST PORT       INDICATION: Atelectasis       COMPARISON: November 27, 2022       TECHNIQUE: Upright portable chest AP view       FINDINGS: Tracheostomy tube is unchanged. The cardiac silhouette is within   normal limits. The pulmonary vasculature is within normal limits. Bibasilar atelectasis. No pleural effusion or pneumothorax. The visualized bones   and upper abdomen are age-appropriate. Results from [place] encounter on 11/27/22    XR CHEST PORT    Narrative  EXAM:  XR CHEST PORT    INDICATION: Atelectasis    COMPARISON: November 27, 2022    TECHNIQUE: Upright portable chest AP view    FINDINGS: Tracheostomy tube is unchanged. The cardiac silhouette is within  normal limits. The pulmonary vasculature is within normal limits. Bibasilar atelectasis. No pleural effusion or pneumothorax. The visualized bones  and upper abdomen are age-appropriate. Impression  Bibasilar atelectasis. XR CHEST PORT    Narrative  INDICATION:  sob    Exam: Portable chest 1331. Comparison: 9/29/2022. Findings: Tracheostomy unchanged. Cardiomediastinal silhouette is within normal  limits. Pulmonary vasculature is not engorged. Left basilar atelectasis. No new  consolidation effusion or pneumothorax. Impression  1. Left basilar atelectasis      Results from Hospital Encounter encounter on 09/29/22    XR CHEST PORT    Narrative  EXAM:  XR CHEST PORT    INDICATION: Shortness of breath    COMPARISON: 12/18/2017. TECHNIQUE: Portable AP upright chest view at 0216 hours    FINDINGS: A tracheostomy tube terminates above the carine. The cardiomediastinal  contours are within normal limits. The lungs and pleural spaces are clear. There  is no pneumothorax. The bones and upper abdomen are stable. Impression  No acute process. Results from [place] encounter on 09/29/22    CT ABD PELV W CONT    Narrative  EXAM:  CT ABD PELV W CONT    INDICATION: Left upper quadrant pain    COMPARISON: CT 9/11/2015.     TECHNIQUE: Helical CT of the abdomen  and pelvis  following the uneventful  intravenous administration of nonionic contrast.  Coronal and sagittal reformats  are performed. CT dose reduction was achieved through use of a standardized  protocol tailored for this examination and automatic exposure control for dose  modulation. FINDINGS:  The visualized lung bases demonstrate no mass or consolidation. The heart size  is normal. There is no pericardial or pleural effusion. The liver, spleen, pancreas, and adrenal glands are normal. A gallstone is noted  without intra- or extra-hepatic biliary dilatation. The kidneys are symmetric without hydronephrosis. Bilateral kidney cysts measure  1 cm for which no imaging follow-up is recommended. A percutaneous gastrostomy tube is in place. There are no dilated bowel loops. The appendix is normal. There is diffuse colonic diverticulosis without focal  adjacent inflammation. There are no enlarged lymph nodes. There is no free fluid or free air. There is  minimal dilatation of the infrarenal aorta measuring 2.4 cm in diameter. The urinary bladder is normal.  There is no pelvic mass. The uterus is  surgically absent. The bony structures are age-appropriate. Impression  No acute abnormality in the abdomen or pelvis. Cholelithiasis. Discussion patient with chronic tracheostomy from myasthenia gravis on noninvasive ventilator normally at night at home has not used since she was admitted. Family brought it in yesterday. We will ask respiratory to set it up to be used tonight. She indicates increasing shortness of breath not sure if that is just from lack of use of the trilogy or if she has an acute pulmonary process. She had minimal atelectasis on admission most recent procalcitonin is normal.  We will repeat chest x-ray we will set up trilogy for use at night. Will do surveillance culture of her sputum. Mucomyst was started yesterday she has some mild wheezing needs a bronchoconstrictive will discontinue it.   If chest x-ray shows collapsed areas of lung we will resume the Mucomyst  12/5 used her home trilogy noninvasive ventilator overnight indicates she feels fine today. CRP and procalcitonin are undetectable. E. coli septicemia seems resolved. Has minimal atelectasis on her chest x-ray very likely due to her underlying diaphragmatic weakness no signs of active infection at this time  Time of care 30 minutes           Thank you for allowing us to participate in the care of this patient.   We will follow along with you   Medina Munguia MD
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
## 2022-12-08 LAB
BACTERIA SPEC CULT: NORMAL
SPECIAL REQUESTS,SREQ: NORMAL

## 2022-12-27 PROBLEM — N39.0 UTI (URINARY TRACT INFECTION): Status: RESOLVED | Noted: 2022-11-27 | Resolved: 2022-12-27

## 2023-01-19 ENCOUNTER — TELEPHONE (OUTPATIENT)
Dept: INTERNAL MEDICINE CLINIC | Age: 68
End: 2023-01-19

## 2023-01-19 NOTE — TELEPHONE ENCOUNTER
Just wanted to let Dr. Vonnie Fontaine know they will not be seeing the patient this week. She has two appointments at [address] [place] [address]. They will see her next week.

## 2023-02-16 ENCOUNTER — TELEPHONE (OUTPATIENT)
Dept: INTERNAL MEDICINE CLINIC | Age: 68
End: 2023-02-16

## 2023-02-16 NOTE — TELEPHONE ENCOUNTER
----- Message from Arash Krishna sent at 2/15/2023  1:44 PM EST -----  Subject: Message to Provider    QUESTIONS  Information for Provider? Saran Gagnon with [facility] called. She   went out for a in home visit on 2/15/2023. The pt is experiencing   constipation. She recently had her formula changed multiple times. She is   manually removing the stool. The home health nurse is requesting a   medication to help with the constipation. The pt is receiving 200ml of   fluids 5 times daily and no other signs of dehydration. please contact   Saran Gagnon at [phone]. please call medication into pt pharmcy, please   contact Saran Gagnon about over the counter meds  ---------------------------------------------------------------------------  --------------  Cue  [phone]; OK to leave message on voicemail  ---------------------------------------------------------------------------  --------------  SCRIPT ANSWERS  Relationship to Patient? Third Party  Third Party Type? Home Health Care? Representative Name?  Saran Gagnon

## 2023-03-02 ENCOUNTER — OFFICE VISIT (OUTPATIENT)
Dept: INTERNAL MEDICINE CLINIC | Age: 68
End: 2023-03-02
Payer: MEDICARE

## 2023-03-02 VITALS
SYSTOLIC BLOOD PRESSURE: 128 MMHG | WEIGHT: 116 LBS | HEIGHT: 62 IN | TEMPERATURE: 97.4 F | DIASTOLIC BLOOD PRESSURE: 82 MMHG | BODY MASS INDEX: 21.35 KG/M2

## 2023-03-02 DIAGNOSIS — G70.01 MYASTHENIA GRAVIS WITH EXACERBATION (HCC): ICD-10-CM

## 2023-03-02 DIAGNOSIS — R13.19 OTHER DYSPHAGIA: ICD-10-CM

## 2023-03-02 DIAGNOSIS — E78.00 HYPERCHOLESTEROLEMIA: ICD-10-CM

## 2023-03-02 DIAGNOSIS — R47.01 APHASIA: Primary | ICD-10-CM

## 2023-03-02 DIAGNOSIS — Z93.1 PEG (PERCUTANEOUS ENDOSCOPIC GASTROSTOMY) STATUS (HCC): ICD-10-CM

## 2023-03-02 DIAGNOSIS — D64.9 MILD ANEMIA: ICD-10-CM

## 2023-03-02 DIAGNOSIS — Z93.0 TRACHEOSTOMY DEPENDENT (HCC): ICD-10-CM

## 2023-03-02 DIAGNOSIS — Z93.1 S/P PERCUTANEOUS ENDOSCOPIC GASTROSTOMY (PEG) TUBE PLACEMENT (HCC): ICD-10-CM

## 2023-03-02 DIAGNOSIS — B02.9 HERPES ZOSTER WITHOUT COMPLICATION: Primary | ICD-10-CM

## 2023-03-02 PROBLEM — H54.3: Status: ACTIVE | Noted: 2023-02-22

## 2023-03-02 PROBLEM — H40.9 GLAUCOMA: Status: ACTIVE | Noted: 2019-01-10

## 2023-03-02 PROBLEM — H21.569 AFFERENT PUPILLARY DEFECT: Status: ACTIVE | Noted: 2019-01-10

## 2023-03-02 PROBLEM — J44.9 CHRONIC OBSTRUCTIVE PULMONARY DISEASE (HCC): Status: ACTIVE | Noted: 2018-08-29

## 2023-03-02 PROBLEM — N39.0 URINARY TRACT INFECTION: Status: ACTIVE | Noted: 2022-11-27

## 2023-03-02 PROBLEM — E83.52 HYPERCALCEMIA: Status: ACTIVE | Noted: 2023-01-03

## 2023-03-02 PROBLEM — R63.4 WEIGHT LOSS: Status: ACTIVE | Noted: 2021-06-15

## 2023-03-02 PROBLEM — Z11.52 ENCOUNTER FOR SCREENING FOR COVID-19: Status: ACTIVE | Noted: 2023-02-03

## 2023-03-02 PROBLEM — R49.1 LOSS OF VOICE: Status: ACTIVE | Noted: 2021-06-15

## 2023-03-02 PROBLEM — K59.01 SLOW TRANSIT CONSTIPATION: Status: ACTIVE | Noted: 2023-02-03

## 2023-03-02 PROBLEM — I10 ESSENTIAL HYPERTENSION: Status: ACTIVE | Noted: 2018-08-29

## 2023-03-02 PROBLEM — L20.9 ATOPIC DERMATITIS: Status: ACTIVE | Noted: 2023-02-03

## 2023-03-02 PROBLEM — H04.129 TEAR FILM INSUFFICIENCY: Status: ACTIVE | Noted: 2021-04-01

## 2023-03-02 PROBLEM — H50.10 EXOTROPIA: Status: ACTIVE | Noted: 2019-01-10

## 2023-03-02 PROBLEM — M25.50 PAIN IN JOINT: Status: ACTIVE | Noted: 2018-08-29

## 2023-03-02 PROBLEM — R68.84 JAW PAIN: Status: ACTIVE | Noted: 2023-01-03

## 2023-03-02 PROCEDURE — 3079F DIAST BP 80-89 MM HG: CPT | Performed by: INTERNAL MEDICINE

## 2023-03-02 PROCEDURE — G8427 DOCREV CUR MEDS BY ELIG CLIN: HCPCS | Performed by: INTERNAL MEDICINE

## 2023-03-02 PROCEDURE — 1101F PT FALLS ASSESS-DOCD LE1/YR: CPT | Performed by: INTERNAL MEDICINE

## 2023-03-02 PROCEDURE — 3017F COLORECTAL CA SCREEN DOC REV: CPT | Performed by: INTERNAL MEDICINE

## 2023-03-02 PROCEDURE — 3074F SYST BP LT 130 MM HG: CPT | Performed by: INTERNAL MEDICINE

## 2023-03-02 PROCEDURE — 1090F PRES/ABSN URINE INCON ASSESS: CPT | Performed by: INTERNAL MEDICINE

## 2023-03-02 PROCEDURE — G8510 SCR DEP NEG, NO PLAN REQD: HCPCS | Performed by: INTERNAL MEDICINE

## 2023-03-02 PROCEDURE — 99214 OFFICE O/P EST MOD 30 MIN: CPT | Performed by: INTERNAL MEDICINE

## 2023-03-02 PROCEDURE — G8536 NO DOC ELDER MAL SCRN: HCPCS | Performed by: INTERNAL MEDICINE

## 2023-03-02 PROCEDURE — G8400 PT W/DXA NO RESULTS DOC: HCPCS | Performed by: INTERNAL MEDICINE

## 2023-03-02 PROCEDURE — G8420 CALC BMI NORM PARAMETERS: HCPCS | Performed by: INTERNAL MEDICINE

## 2023-03-02 PROCEDURE — 1123F ACP DISCUSS/DSCN MKR DOCD: CPT | Performed by: INTERNAL MEDICINE

## 2023-03-02 RX ORDER — VALACYCLOVIR HYDROCHLORIDE 1 G/1
1000 TABLET, FILM COATED ORAL 3 TIMES DAILY
Qty: 21 TABLET | Refills: 0 | Status: SHIPPED | OUTPATIENT
Start: 2023-03-02

## 2023-03-02 NOTE — PROGRESS NOTES
Chief Complaint   Patient presents with    ED Follow-up    Follow Up Chronic Condition    Rash     1. \"Have you been to the ER, urgent care clinic since your last visit? Hospitalized since your last visit? \"  [facility] ED x 2    2. \"Have you seen or consulted any other health care providers outside of the [address] [place] [address] since your last visit? \" No     3. For patients aged 39-70: Has the patient had a colonoscopy / FIT/ Cologuard? No      If the patient is female:    4. For patients aged 41-77: Has the patient had a mammogram within the past 2 years? Yes - Care Gap present. Most recent result on file      5. For patients aged 21-65: Has the patient had a pap smear?  NA - based on age or sex

## 2023-03-02 NOTE — PROGRESS NOTES
[address] [facility]  [address]  [address] [facility], [address] [facility]  Phone: [phone]      Alejandra Maher (: 1955) is a 78 y.o. female, established patient, here for evaluation of the following chief complaint(s):  ED Follow-up, Follow Up Chronic Condition, and Rash         SUBJECTIVE/OBJECTIVE:  HPI:  Patient is here for follow up, patient communicates her symptoms by writing. She did not have routine blood work for this visit. She has Guru Fito her  helping her today. She was seen at [facility] ED 2 times recently, once in January for dyspnea and she had a chest x-ray and blood work. She was seen again on  for generalized body aches, she was given Baclofen and Ibuprofen to use as needed. She states that she is out of her eye drops, they are red and blue in color. She has a suction machine to use, she had tracheostomy and feeding tube.  informed she has a skin lesion over her right cheek and he noticed it yesterday. Patient denies any severe pain over the right side of the face. Prior to Admission medications    Medication Sig Start Date End Date Taking? Authorizing Provider   valACYclovir (VALTREX) 1 gram tablet Take 1 Tablet by mouth three (3) times daily. 3/2/23  Yes Vladimir Govea MD   predniSONE (DELTASONE) 20 mg tablet 1 Tablet by Per G Tube route daily. 22  Yes Abbey Moura MD   fluticasone furoate-vilanteroL (BREO ELLIPTA) 100-25 mcg/dose inhaler Take 1 Puff by inhalation daily. Yes Provider, Historical   atropine 0.01 % dpem Apply  to eye daily. Yes Provider, Historical   albuterol (PROVENTIL HFA, VENTOLIN HFA, PROAIR HFA) 90 mcg/actuation inhaler Take 2 Puffs by inhalation. Yes Provider, Historical   chlorhexidine (PERIDEX) 0.12 % solution 15 mL by Swish and Spit route as needed. Yes Provider, Historical   netarsudiL (Rhopressa) 0.02 % drop Apply  to eye daily.  1 drop each eye   Yes Provider, Historical   apixaban (ELIQUIS) 5 mg tablet Take 5 mg by mouth two (2) times a day. 8/30/22  Yes Provider, Historical   brinzolamide-brimonidine (Simbrinza) 1-0.2 % drps Apply 1 Drop to eye three (3) times daily. 4/14/22  Yes Provider, Historical   dilTIAZem IR (CARDIZEM) 60 mg tablet Take 60 mg by mouth two (2) times a day. 9/12/22 9/12/23 Yes Provider, Historical   fluticasone propion-salmeteroL (ADVAIR/WIXELA) 100-50 mcg/dose diskus inhaler Take 1 Puff by inhalation. 9/6/22 9/6/23 Yes Provider, Historical   losartan (COZAAR) 25 mg tablet Take 25 mg by mouth daily. 9/11/22  Yes Provider, Historical   mycophenolate mofetil (CELLCEPT) 200 mg/mL suspension 1,000 mg two (2) times a day. 9/9/22 9/9/23 Yes Provider, Historical   pyridostigmine (MESTINON) 60 mg tablet Take 30 mg by mouth two (2) times a day. 9/12/22 9/12/23 Yes Provider, Historical   ondansetron hcl (Zofran) 4 mg tablet Take 1 Tablet by mouth every eight (8) hours as needed for Nausea or Vomiting. 9/29/22  Yes Mao Patiño MD   cholecalciferol, vitamin D3, 10 mcg/mL (400 unit/mL) oral solution Take 20 mcg by mouth daily. Patient not taking: Reported on 3/2/2023 6/7/22   Provider, Historical        Allergies   Allergen Reactions    Amoxicillin Rash      Reaction Type: Allergy; Severity: UNKNOWN    Clavulanic Acid Rash      Reaction Type: Allergy; Severity: UNKNOWN    Penicillin G Hives and Rash     Reaction Type:  Allergy    Soliris [Eculizumab] Unknown (comments)        Past Medical History:   Diagnosis Date    Aphasia     Atrial fibrillation (HCC)     Chronic obstructive pulmonary disease (HCC)     Hypercalcemia     Hypercholesterolemia     Hypertension     Myasthenia (HCC)     Other dysphagia     PEG (percutaneous endoscopic gastrostomy) status (HCC)     Polyarthralgia     Tracheostomy dependent ([address]         Family History   Problem Relation Age of Onset    Diabetes Mother     Heart Failure Mother     COPD Mother     Hypertension Father     Heart Disease Father     Glaucoma Father Heart Attack Maternal Grandmother         Past Surgical History:   Procedure Laterality Date    HX OTHER SURGICAL      Thymus tissue removed    HX TRACHEOSTOMY         Review of Systems   Constitutional:  Negative for chills and fever. HENT:  Negative for congestion, ear pain, nosebleeds, sinus pain, sore throat and tinnitus. Eyes:  Negative for redness. Respiratory:  Negative for cough and shortness of breath. Cardiovascular:  Negative for chest pain and palpitations. Gastrointestinal:  Negative for abdominal pain, diarrhea, nausea and vomiting. Endocrine: Negative for cold intolerance and polyuria. Genitourinary:  Negative for dysuria and hematuria. Musculoskeletal:  Negative for back pain and neck pain. Skin:  Positive for rash. Neurological:  Negative for dizziness and headaches. Psychiatric/Behavioral: Negative. /82 (BP 1 Location: Left upper arm, BP Patient Position: Sitting)   Temp 97.4 °F (36.3 °C) (Temporal)   Ht 5' 2\" (1.575 m)   Wt 116 lb (52.6 kg)   BMI 21.22 kg/m²      Physical Exam  Constitutional:       Appearance:Middle aged thin built patient. HENT:      Head: Normocephalic and atraumatic. Has multiple grouped vesicular lesions over the right forehead right cheek, right ear, right side of neck, and the right upper anterior chest.       Right Ear: External with vesicular lesions     Left Ear: External ear normal.      Nose: Nose normal.  No vesicular lesions over the tip of the nose. Mouth/Throat: Tracheostomy tube present     Mouth: Mucous membranes are moist, has lots of secretions from the mouth and patient is keeping it small towel near her mouth  Eyes:      Extraocular Movements: Extraocular movements intact. Pupils: Pupils are equal, round, and reactive to light. Cardiovascular:      Rate and Rhythm: Normal rate and regular rhythm. Pulmonary:      Effort: Pulmonary effort is normal.      Breath sounds: Normal breath sounds.    Abdominal: Palpations: Abdomen is soft. G-tube present     Tenderness: There is no abdominal tenderness. Musculoskeletal:      Cervical back: Normal range of motion and neck supple. Right lower leg: No edema. Left lower leg: No edema. Skin:     General: Skin is warm and dry. Neurological:      General: No focal deficit present. Mental Status: She is alert and oriented to person, place, and time. Communicates by writing  Psychiatric:         Mood and Affect: Mood normal.    ASSESSMENT/PLAN:  Below is the assessment and plan developed based on review of pertinent history, physical exam, labs, studies, and medications. 1. Herpes zoster without complication  Comments: Will treat with Valtrex,  had chickenpox in the past, advised to check with ophthalmologist regarding eyedrops  2. Myasthenia gravis with exacerbation ([facility] [address]  Comments:  Advised to follow-up with neurology at [facility]  3. Tracheostomy dependent ([facility] [address]  Comments:  Status post tracheostomy due to difficulty in breathing due to myasthenia, records reviewed  4. Mild anemia  Comments:  Hemoglobin globin 10.9 in December, records reviewed, she had COPD exacerbation, cystitis with E. coli  5. S/P percutaneous endoscopic gastrostomy (PEG) tube placement ([facility] [address]    Return in about 5 months (around 8/2/2023). There are no Patient Instructions on file for this visit. Health Maintenance Due   Topic Date Due    Hepatitis C Screening  Never done    Depression Screen  Never done    Shingles Vaccine (1 of 2) Never done    DTaP/Tdap/Td series (1 - Tdap) Never done    Colorectal Cancer Screening Combo  Never done    Medicare Yearly Exam  Never done    Bone Densitometry (Dexa) Screening  Never done    COVID-19 Vaccine (3 - Moderna risk series) 12/28/2021    Flu Vaccine (1) 08/01/2022        Aspects of this note may have been generated using voice recognition software. Despite editing, there may be unrecognized errors.     An electronic signature was used to authenticate this note.   -- Alba Singh MD

## 2023-04-01 PROBLEM — N39.0 URINARY TRACT INFECTION: Status: RESOLVED | Noted: 2022-11-27 | Resolved: 2023-04-01

## 2023-04-28 ENCOUNTER — TELEPHONE (OUTPATIENT)
Dept: INTERNAL MEDICINE CLINIC | Age: 68
End: 2023-04-28

## 2023-05-03 ENCOUNTER — HOSPITAL ENCOUNTER (EMERGENCY)
Age: 68
Discharge: HOME | End: 2023-05-03
Attending: STUDENT IN AN ORGANIZED HEALTH CARE EDUCATION/TRAINING PROGRAM
Payer: MEDICARE

## 2023-05-03 ENCOUNTER — APPOINTMENT (OUTPATIENT)
Dept: GENERAL RADIOLOGY | Age: 68
End: 2023-05-03
Attending: STUDENT IN AN ORGANIZED HEALTH CARE EDUCATION/TRAINING PROGRAM
Payer: MEDICARE

## 2023-05-03 VITALS
TEMPERATURE: 97.1 F | WEIGHT: 116 LBS | DIASTOLIC BLOOD PRESSURE: 99 MMHG | SYSTOLIC BLOOD PRESSURE: 117 MMHG | RESPIRATION RATE: 20 BRPM | HEART RATE: 67 BPM | OXYGEN SATURATION: 99 % | BODY MASS INDEX: 21.22 KG/M2

## 2023-05-03 DIAGNOSIS — Z43.0 TRACHEOSTOMY CARE (HCC): Primary | ICD-10-CM

## 2023-05-03 PROCEDURE — 99283 EMERGENCY DEPT VISIT LOW MDM: CPT

## 2023-05-03 PROCEDURE — 71046 X-RAY EXAM CHEST 2 VIEWS: CPT

## 2023-05-09 ENCOUNTER — OFFICE VISIT (OUTPATIENT)
Facility: CLINIC | Age: 68
End: 2023-05-09
Payer: MEDICAID

## 2023-05-09 VITALS
HEART RATE: 64 BPM | BODY MASS INDEX: 20.8 KG/M2 | DIASTOLIC BLOOD PRESSURE: 90 MMHG | HEIGHT: 62 IN | SYSTOLIC BLOOD PRESSURE: 124 MMHG | OXYGEN SATURATION: 98 % | WEIGHT: 113 LBS | TEMPERATURE: 97.9 F

## 2023-05-09 DIAGNOSIS — Z93.1 PEG (PERCUTANEOUS ENDOSCOPIC GASTROSTOMY) STATUS (HCC): ICD-10-CM

## 2023-05-09 DIAGNOSIS — E83.52 HYPERCALCEMIA: ICD-10-CM

## 2023-05-09 DIAGNOSIS — G70.01 MYASTHENIA GRAVIS WITH (ACUTE) EXACERBATION (HCC): ICD-10-CM

## 2023-05-09 DIAGNOSIS — K11.7 DROOLING: ICD-10-CM

## 2023-05-09 DIAGNOSIS — Z93.0 TRACHEOSTOMY STATUS (HCC): ICD-10-CM

## 2023-05-09 DIAGNOSIS — J96.01 ACUTE RESPIRATORY FAILURE WITH HYPOXIA (HCC): Primary | ICD-10-CM

## 2023-05-09 PROBLEM — H54.3: Status: ACTIVE | Noted: 2023-02-22

## 2023-05-09 PROBLEM — H40.9 GLAUCOMA: Status: ACTIVE | Noted: 2019-01-10

## 2023-05-09 PROBLEM — U07.1 COVID-19: Status: ACTIVE | Noted: 2023-05-09

## 2023-05-09 PROCEDURE — 1123F ACP DISCUSS/DSCN MKR DOCD: CPT | Performed by: INTERNAL MEDICINE

## 2023-05-09 PROCEDURE — 3074F SYST BP LT 130 MM HG: CPT | Performed by: INTERNAL MEDICINE

## 2023-05-09 PROCEDURE — 99214 OFFICE O/P EST MOD 30 MIN: CPT | Performed by: INTERNAL MEDICINE

## 2023-05-09 PROCEDURE — 3080F DIAST BP >= 90 MM HG: CPT | Performed by: INTERNAL MEDICINE

## 2023-05-09 RX ORDER — BACLOFEN 5 MG/1
5 TABLET ORAL DAILY
COMMUNITY
Start: 2023-02-26

## 2023-05-09 RX ORDER — CHOLECALCIFEROL (VITAMIN D3) 10(400)/ML
20 DROPS ORAL DAILY
COMMUNITY
Start: 2022-06-07

## 2023-05-09 RX ORDER — MYCOPHENOLATE MOFETIL 200 MG/ML
5 POWDER, FOR SUSPENSION ORAL 2 TIMES DAILY
COMMUNITY
Start: 2023-04-07

## 2023-05-09 RX ORDER — VALACYCLOVIR HYDROCHLORIDE 1 G/1
1000 TABLET, FILM COATED ORAL 3 TIMES DAILY
COMMUNITY
Start: 2023-03-02

## 2023-05-09 RX ORDER — DORZOLAMIDE HCL 20 MG/ML
1 SOLUTION/ DROPS OPHTHALMIC 3 TIMES DAILY
Qty: 6 ML | Refills: 2 | COMMUNITY
Start: 2023-03-29 | End: 2023-06-27

## 2023-05-09 RX ORDER — DIGOXIN 125 MCG
0.12 TABLET ORAL DAILY
COMMUNITY
Start: 2021-01-13

## 2023-05-09 RX ORDER — IPRATROPIUM BROMIDE 42 UG/1
1 SPRAY, METERED NASAL 4 TIMES DAILY
COMMUNITY
Start: 2023-04-15

## 2023-05-09 RX ORDER — ALBUTEROL SULFATE 90 UG/1
2 AEROSOL, METERED RESPIRATORY (INHALATION)
COMMUNITY

## 2023-05-09 RX ORDER — LOSARTAN POTASSIUM 25 MG/1
25 TABLET ORAL DAILY
COMMUNITY
Start: 2022-09-11

## 2023-05-09 RX ORDER — DILTIAZEM HYDROCHLORIDE 60 MG/1
60 TABLET, FILM COATED ORAL 2 TIMES DAILY
Qty: 60 TABLET | Refills: 11 | COMMUNITY
Start: 2022-09-12 | End: 2023-09-12

## 2023-05-09 RX ORDER — LATANOPROST 50 UG/ML
1 SOLUTION/ DROPS OPHTHALMIC NIGHTLY
COMMUNITY
Start: 2023-03-20

## 2023-05-09 RX ORDER — DILTIAZEM HYDROCHLORIDE 120 MG/1
1 CAPSULE, EXTENDED RELEASE ORAL DAILY
COMMUNITY
Start: 2023-04-10

## 2023-05-09 RX ORDER — BRIMONIDINE TARTRATE 2 MG/ML
1 SOLUTION/ DROPS OPHTHALMIC DAILY
COMMUNITY
Start: 2023-05-04

## 2023-05-09 RX ORDER — BUDESONIDE 0.5 MG/2ML
2 INHALANT ORAL
COMMUNITY
Start: 2023-05-08

## 2023-05-09 RX ORDER — GLYCOPYRROLATE 1 MG/1
1 TABLET ORAL 3 TIMES DAILY
Qty: 90 TABLET | Refills: 11 | COMMUNITY
Start: 2023-04-14 | End: 2024-04-13

## 2023-05-09 RX ORDER — FLUTICASONE PROPIONATE AND SALMETEROL 250; 50 UG/1; UG/1
1 POWDER RESPIRATORY (INHALATION) EVERY 12 HOURS
COMMUNITY
Start: 2021-01-13

## 2023-05-09 SDOH — ECONOMIC STABILITY: FOOD INSECURITY: WITHIN THE PAST 12 MONTHS, THE FOOD YOU BOUGHT JUST DIDN'T LAST AND YOU DIDN'T HAVE MONEY TO GET MORE.: NEVER TRUE

## 2023-05-09 SDOH — ECONOMIC STABILITY: HOUSING INSECURITY
IN THE LAST 12 MONTHS, WAS THERE A TIME WHEN YOU DID NOT HAVE A STEADY PLACE TO SLEEP OR SLEPT IN A SHELTER (INCLUDING NOW)?: NO

## 2023-05-09 SDOH — ECONOMIC STABILITY: INCOME INSECURITY: HOW HARD IS IT FOR YOU TO PAY FOR THE VERY BASICS LIKE FOOD, HOUSING, MEDICAL CARE, AND HEATING?: SOMEWHAT HARD

## 2023-05-09 SDOH — ECONOMIC STABILITY: FOOD INSECURITY: WITHIN THE PAST 12 MONTHS, YOU WORRIED THAT YOUR FOOD WOULD RUN OUT BEFORE YOU GOT MONEY TO BUY MORE.: NEVER TRUE

## 2023-05-09 ASSESSMENT — ANXIETY QUESTIONNAIRES
1. FEELING NERVOUS, ANXIOUS, OR ON EDGE: 0
6. BECOMING EASILY ANNOYED OR IRRITABLE: 0
3. WORRYING TOO MUCH ABOUT DIFFERENT THINGS: 0
2. NOT BEING ABLE TO STOP OR CONTROL WORRYING: 0
5. BEING SO RESTLESS THAT IT IS HARD TO SIT STILL: 0
IF YOU CHECKED OFF ANY PROBLEMS ON THIS QUESTIONNAIRE, HOW DIFFICULT HAVE THESE PROBLEMS MADE IT FOR YOU TO DO YOUR WORK, TAKE CARE OF THINGS AT HOME, OR GET ALONG WITH OTHER PEOPLE: NOT DIFFICULT AT ALL
GAD7 TOTAL SCORE: 0
4. TROUBLE RELAXING: 0
7. FEELING AFRAID AS IF SOMETHING AWFUL MIGHT HAPPEN: 0

## 2023-05-09 NOTE — PROGRESS NOTES
Chief Complaint   Patient presents with    ED Follow-up    Follow-up Chronic Condition     1. \"Have you been to the ER, urgent care clinic since your last visit? Hospitalized since your last visit? \"  [address] [place] [address] [address]    2. \"Have you seen or consulted any other health care providers outside of the [address] [place] [address] since your last visit? \" No     3. For patients aged 39-70: Has the patient had a colonoscopy / FIT/ Cologuard? No      If the patient is female:    4. For patients aged 41-77: Has the patient had a mammogram within the past 2 years? Yes - Care Gap present. Most recent result on file      5. For patients aged 21-65: Has the patient had a pap smear?  NA - based on age or sex
mouth in the morning and at bedtime 4/7/23   Historical Provider, MD   valACYclovir (VALTREX) 1 g tablet Take 1 tablet by mouth 3 times daily 3/2/23   Historical Provider, MD        Allergies   Allergen Reactions    Amoxicillin Rash      Reaction Type: Allergy; Severity: UNKNOWN    Eculizumab Itching    Penicillin G Hives and Rash     Reaction Type: Allergy        Past Medical History:   Diagnosis Date    Aphasia     Atrial fibrillation (HCC)     Chronic obstructive pulmonary disease (HCC)     Hypercalcemia     Hypercholesterolemia     Hypertension     Myasthenia ([facility] [address]     Other dysphagia     PEG (percutaneous endoscopic gastrostomy) status ([address]     Polyarthralgia     Tracheostomy dependent ([address]       Family History   Problem Relation Age of Onset    Heart Attack Maternal Grandmother     Glaucoma Father     Heart Disease Father     Hypertension Father     COPD Mother     Heart Failure Mother     Diabetes Mother        [unfilled]     Past Surgical History:   Procedure Laterality Date    OTHER SURGICAL HISTORY      Thymus tissue removed    TRACHEOSTOMY          Review of Systems  Constitutional:  Negative for chills and fever. HENT:  Negative for congestion, ear pain, nosebleeds, sinus pain, sore throat and tinnitus. Eyes:  Negative for redness. Respiratory:  Negative for cough and shortness of breath. Cardiovascular:  Negative for chest pain and palpitations. Gastrointestinal:  Negative for abdominal pain, diarrhea, nausea and vomiting. Endocrine: Negative for cold intolerance and polyuria. Genitourinary:  Negative for dysuria and hematuria. Musculoskeletal:  Negative for back pain and neck pain. Skin:  Negative for rash. Neurological:  Negative for dizziness and headaches. Psychiatric/Behavioral: Negative. Blood pressure (!) 124/90, pulse 64, temperature 97.9 °F (36.6 °C), temperature source Temporal, height 5' 2\" (1.575 m), weight 113 lb (51.3 kg), SpO2 98 %.      Physical

## 2023-05-21 ENCOUNTER — APPOINTMENT (OUTPATIENT)
Facility: HOSPITAL | Age: 68
End: 2023-05-21
Payer: MEDICARE

## 2023-05-21 ENCOUNTER — HOSPITAL ENCOUNTER (EMERGENCY)
Facility: HOSPITAL | Age: 68
Discharge: HOME | End: 2023-05-21
Attending: EMERGENCY MEDICINE
Payer: MEDICARE

## 2023-05-21 VITALS
BODY MASS INDEX: 20.98 KG/M2 | HEART RATE: 78 BPM | DIASTOLIC BLOOD PRESSURE: 90 MMHG | RESPIRATION RATE: 22 BRPM | WEIGHT: 114 LBS | OXYGEN SATURATION: 100 % | SYSTOLIC BLOOD PRESSURE: 143 MMHG | HEIGHT: 62 IN | TEMPERATURE: 97.6 F

## 2023-05-21 DIAGNOSIS — R07.89 ATYPICAL CHEST PAIN: Primary | ICD-10-CM

## 2023-05-21 DIAGNOSIS — Z98.890 HISTORY OF TRACHEOSTOMY: ICD-10-CM

## 2023-05-21 DIAGNOSIS — R09.3 INCREASED SPUTUM PRODUCTION: ICD-10-CM

## 2023-05-21 LAB
ALBUMIN SERPL-MCNC: 4.5 G/DL (ref 3.5–5)
ALBUMIN/GLOB SERPL: 1.3 (ref 1.1–2.2)
ALP SERPL-CCNC: 85 U/L (ref 45–117)
ALT SERPL-CCNC: 51 U/L (ref 12–78)
ANION GAP SERPL CALC-SCNC: 5 MMOL/L (ref 5–15)
APPEARANCE UR: ABNORMAL
AST SERPL W P-5'-P-CCNC: 28 U/L (ref 15–37)
BACTERIA URNS QL MICRO: NEGATIVE /HPF
BILIRUB SERPL-MCNC: 0.7 MG/DL (ref 0.2–1)
BILIRUB UR QL: NEGATIVE
BNP SERPL-MCNC: 363 PG/ML
BUN SERPL-MCNC: 25 MG/DL (ref 6–20)
BUN/CREAT SERPL: 35 (ref 12–20)
CA-I BLD-MCNC: 10.8 MG/DL (ref 8.5–10.1)
CHLORIDE SERPL-SCNC: 102 MMOL/L (ref 97–108)
CO2 SERPL-SCNC: 31 MMOL/L (ref 21–32)
COLOR UR: ABNORMAL
CREAT SERPL-MCNC: 0.71 MG/DL (ref 0.55–1.02)
EKG ATRIAL RATE: 80 BPM
EKG ATRIAL RATE: 85 BPM
EKG DIAGNOSIS: NORMAL
EKG DIAGNOSIS: NORMAL
EKG P AXIS: 27 DEGREES
EKG P AXIS: 29 DEGREES
EKG P-R INTERVAL: 120 MS
EKG P-R INTERVAL: 120 MS
EKG Q-T INTERVAL: 370 MS
EKG Q-T INTERVAL: 382 MS
EKG QRS DURATION: 68 MS
EKG QRS DURATION: 68 MS
EKG QTC CALCULATION (BAZETT): 440 MS
EKG QTC CALCULATION (BAZETT): 440 MS
EKG R AXIS: 0 DEGREES
EKG R AXIS: 4 DEGREES
EKG T AXIS: 67 DEGREES
EKG T AXIS: 69 DEGREES
EKG VENTRICULAR RATE: 80 BPM
EKG VENTRICULAR RATE: 85 BPM
EPITH CASTS URNS QL MICRO: ABNORMAL /LPF
ERYTHROCYTE [DISTWIDTH] IN BLOOD BY AUTOMATED COUNT: 15.3 % (ref 11.5–14.5)
GLOBULIN SER CALC-MCNC: 3.5 G/DL (ref 2–4)
GLUCOSE SERPL-MCNC: 191 MG/DL (ref 65–100)
GLUCOSE UR STRIP.AUTO-MCNC: 150 MG/DL
HCT VFR BLD AUTO: 45.9 % (ref 35–47)
HGB BLD-MCNC: 14.3 G/DL (ref 11.5–16)
HGB UR QL STRIP: ABNORMAL
KETONES UR QL STRIP.AUTO: NEGATIVE MG/DL
LEUKOCYTE ESTERASE UR QL STRIP.AUTO: ABNORMAL
MCH RBC QN AUTO: 30.7 PG (ref 26–34)
MCHC RBC AUTO-ENTMCNC: 31.2 G/DL (ref 30–36.5)
MCV RBC AUTO: 98.5 FL (ref 80–99)
NITRITE UR QL STRIP.AUTO: NEGATIVE
NRBC # BLD: 0 K/UL (ref 0–0.01)
NRBC BLD-RTO: 0 PER 100 WBC
PH UR STRIP: 8 (ref 5–8)
PLATELET # BLD AUTO: 192 K/UL (ref 150–400)
PMV BLD AUTO: 12.8 FL (ref 8.9–12.9)
POTASSIUM SERPL-SCNC: 4.5 MMOL/L (ref 3.5–5.1)
PROT SERPL-MCNC: 8 G/DL (ref 6.4–8.2)
PROT UR STRIP-MCNC: NEGATIVE MG/DL
RBC # BLD AUTO: 4.66 M/UL (ref 3.8–5.2)
RBC #/AREA URNS HPF: ABNORMAL /HPF (ref 0–5)
SODIUM SERPL-SCNC: 138 MMOL/L (ref 136–145)
SP GR UR REFRACTOMETRY: 1.02 (ref 1–1.03)
TROPONIN I SERPL HS-MCNC: 24 NG/L (ref 0–51)
TROPONIN I SERPL HS-MCNC: 29 NG/L (ref 0–51)
TROPONIN I SERPL HS-MCNC: 32 NG/L (ref 0–51)
UROBILINOGEN UR QL STRIP.AUTO: 0.1 EU/DL (ref 0.1–1)
WBC # BLD AUTO: 12.8 K/UL (ref 3.6–11)
WBC URNS QL MICRO: ABNORMAL /HPF (ref 0–4)

## 2023-05-21 PROCEDURE — 81001 URINALYSIS AUTO W/SCOPE: CPT

## 2023-05-21 PROCEDURE — 71045 X-RAY EXAM CHEST 1 VIEW: CPT

## 2023-05-21 PROCEDURE — 80053 COMPREHEN METABOLIC PANEL: CPT

## 2023-05-21 PROCEDURE — 93005 ELECTROCARDIOGRAM TRACING: CPT | Performed by: EMERGENCY MEDICINE

## 2023-05-21 PROCEDURE — 83880 ASSAY OF NATRIURETIC PEPTIDE: CPT

## 2023-05-21 PROCEDURE — 84484 ASSAY OF TROPONIN QUANT: CPT

## 2023-05-21 PROCEDURE — 93010 ELECTROCARDIOGRAM REPORT: CPT | Performed by: INTERNAL MEDICINE

## 2023-05-21 PROCEDURE — 85027 COMPLETE CBC AUTOMATED: CPT

## 2023-05-21 PROCEDURE — 93005 ELECTROCARDIOGRAM TRACING: CPT | Performed by: STUDENT IN AN ORGANIZED HEALTH CARE EDUCATION/TRAINING PROGRAM

## 2023-05-21 PROCEDURE — 36415 COLL VENOUS BLD VENIPUNCTURE: CPT

## 2023-05-21 PROCEDURE — 2580000003 HC RX 258: Performed by: EMERGENCY MEDICINE

## 2023-05-21 PROCEDURE — 6370000000 HC RX 637 (ALT 250 FOR IP): Performed by: EMERGENCY MEDICINE

## 2023-05-21 PROCEDURE — 99285 EMERGENCY DEPT VISIT HI MDM: CPT

## 2023-05-21 RX ORDER — DOXYCYCLINE HYCLATE 100 MG
100 TABLET ORAL 2 TIMES DAILY
Qty: 20 TABLET | Refills: 0 | Status: SHIPPED | OUTPATIENT
Start: 2023-05-21 | End: 2023-05-31

## 2023-05-21 RX ORDER — 0.9 % SODIUM CHLORIDE 0.9 %
1000 INTRAVENOUS SOLUTION INTRAVENOUS ONCE
Status: COMPLETED | OUTPATIENT
Start: 2023-05-21 | End: 2023-05-21

## 2023-05-21 RX ORDER — CYCLOBENZAPRINE HCL 10 MG
5 TABLET ORAL ONCE
Status: COMPLETED | OUTPATIENT
Start: 2023-05-21 | End: 2023-05-21

## 2023-05-21 RX ADMIN — SODIUM CHLORIDE 1000 ML: 9 INJECTION, SOLUTION INTRAVENOUS at 14:55

## 2023-05-21 RX ADMIN — CYCLOBENZAPRINE 5 MG: 10 TABLET, FILM COATED ORAL at 20:40

## 2023-05-21 ASSESSMENT — LIFESTYLE VARIABLES
HOW OFTEN DO YOU HAVE A DRINK CONTAINING ALCOHOL: NEVER
HOW MANY STANDARD DRINKS CONTAINING ALCOHOL DO YOU HAVE ON A TYPICAL DAY: PATIENT DOES NOT DRINK

## 2023-05-21 ASSESSMENT — PAIN - FUNCTIONAL ASSESSMENT: PAIN_FUNCTIONAL_ASSESSMENT: ADULT NONVERBAL PAIN SCALE (NPVS)

## 2023-05-21 ASSESSMENT — HEART SCORE: ECG: 1

## 2023-05-25 RX ORDER — ONDANSETRON 4 MG/1
TABLET, FILM COATED ORAL EVERY 8 HOURS PRN
COMMUNITY
Start: 2022-09-29

## 2023-05-25 RX ORDER — FLUTICASONE FUROATE AND VILANTEROL 100; 25 UG/1; UG/1
1 POWDER RESPIRATORY (INHALATION) DAILY
COMMUNITY

## 2023-05-25 RX ORDER — CHLORHEXIDINE GLUCONATE 0.12 MG/ML
RINSE ORAL PRN
COMMUNITY

## 2023-05-25 RX ORDER — BRINZOLAMIDE/BRIMONIDINE TARTRATE 10; 2 MG/ML; MG/ML
1 SUSPENSION/ DROPS OPHTHALMIC 3 TIMES DAILY
COMMUNITY
Start: 2022-04-14

## 2023-07-10 ENCOUNTER — TELEPHONE (OUTPATIENT)
Facility: CLINIC | Age: 68
End: 2023-07-10

## 2023-07-10 NOTE — TELEPHONE ENCOUNTER
Riccardo Mello from [facility] called. Requesting to speak to a nurse. She can be reached at [phone].
